# Patient Record
Sex: MALE | Race: ASIAN | Employment: FULL TIME | URBAN - METROPOLITAN AREA
[De-identification: names, ages, dates, MRNs, and addresses within clinical notes are randomized per-mention and may not be internally consistent; named-entity substitution may affect disease eponyms.]

---

## 2017-01-10 ENCOUNTER — OFFICE VISIT (OUTPATIENT)
Dept: FAMILY MEDICINE CLINIC | Age: 37
End: 2017-01-10

## 2017-01-10 VITALS
OXYGEN SATURATION: 99 % | RESPIRATION RATE: 12 BRPM | WEIGHT: 267.6 LBS | HEART RATE: 78 BPM | TEMPERATURE: 97.8 F | SYSTOLIC BLOOD PRESSURE: 126 MMHG | BODY MASS INDEX: 36.24 KG/M2 | DIASTOLIC BLOOD PRESSURE: 89 MMHG | HEIGHT: 72 IN

## 2017-01-10 DIAGNOSIS — M54.50 MIDLINE LOW BACK PAIN WITHOUT SCIATICA, UNSPECIFIED CHRONICITY: ICD-10-CM

## 2017-01-10 DIAGNOSIS — J45.909 BRONCHITIS WITH ASTHMA, SUBACUTE: Primary | ICD-10-CM

## 2017-01-10 DIAGNOSIS — J20.9 BRONCHITIS WITH ASTHMA, SUBACUTE: Primary | ICD-10-CM

## 2017-01-10 DIAGNOSIS — J30.89 PERENNIAL ALLERGIC RHINITIS, UNSPECIFIED ALLERGIC RHINITIS TRIGGER: ICD-10-CM

## 2017-01-10 RX ORDER — CYCLOBENZAPRINE HCL 10 MG
10 TABLET ORAL
Qty: 20 TAB | Refills: 0 | Status: SHIPPED | OUTPATIENT
Start: 2017-01-10 | End: 2020-01-29

## 2017-01-10 RX ORDER — CODEINE PHOSPHATE AND GUAIFENESIN 10; 100 MG/5ML; MG/5ML
5 SOLUTION ORAL
Qty: 120 ML | Refills: 0 | Status: SHIPPED | OUTPATIENT
Start: 2017-01-10 | End: 2017-10-24 | Stop reason: ALTCHOICE

## 2017-01-10 RX ORDER — FLUTICASONE PROPIONATE 50 MCG
2 SPRAY, SUSPENSION (ML) NASAL DAILY
Qty: 1 BOTTLE | Refills: 5 | Status: SHIPPED | OUTPATIENT
Start: 2017-01-10

## 2017-01-10 RX ORDER — FLUTICASONE PROPIONATE 50 MCG
2 SPRAY, SUSPENSION (ML) NASAL DAILY
COMMUNITY
End: 2017-01-10 | Stop reason: SDUPTHER

## 2017-01-10 RX ORDER — ALBUTEROL SULFATE 90 UG/1
1-2 AEROSOL, METERED RESPIRATORY (INHALATION)
Qty: 1 INHALER | Refills: 2 | Status: SHIPPED | OUTPATIENT
Start: 2017-01-10

## 2017-01-10 RX ORDER — METHYLPREDNISOLONE 4 MG/1
TABLET ORAL
Qty: 1 DOSE PACK | Refills: 0 | Status: SHIPPED | OUTPATIENT
Start: 2017-01-10 | End: 2017-08-02 | Stop reason: ALTCHOICE

## 2017-01-10 NOTE — PROGRESS NOTES
HISTORY OF PRESENT ILLNESS  Nato Daniel is a 39 y.o. male. HPI  Upper Respiratory Infection  Patient complains of symptoms of a URI. Symptoms include congestion and cough. Onset of symptoms was 1 month ago, unchanged since that time. He also c/o congestion, coryza, no  fever, non productive cough, post nasal drip and wheezing for the past 1 month . He is drinking plenty of fluids. . Evaluation to date: none. Treatment to date: cough suppressants. Review of Systems   Constitutional: Positive for malaise/fatigue. Negative for chills and fever. HENT: Positive for congestion and sore throat. Negative for ear pain. Respiratory: Positive for cough, sputum production and wheezing. Negative for shortness of breath. Neurological: Negative for headaches. Physical Exam   Constitutional: He is oriented to person, place, and time. He appears well-developed and well-nourished. No distress. HENT:   Right Ear: Tympanic membrane and ear canal normal.   Left Ear: Tympanic membrane and ear canal normal.   Nose: Mucosal edema present. Right sinus exhibits no maxillary sinus tenderness and no frontal sinus tenderness. Left sinus exhibits no maxillary sinus tenderness and no frontal sinus tenderness. Mouth/Throat: Oropharynx is clear and moist.   Cardiovascular: Normal rate, regular rhythm and normal heart sounds. No murmur heard. Pulmonary/Chest: Effort normal. He has no decreased breath sounds. He has wheezes (expiratory) in the left middle field and the left lower field. He has no rhonchi. Lymphadenopathy:     He has no cervical adenopathy. Neurological: He is alert and oriented to person, place, and time. Psychiatric: He has a normal mood and affect. His behavior is normal.   Nursing note and vitals reviewed. ASSESSMENT and PLAN  Mihai was seen today for cough. Diagnoses and all orders for this visit:    Bronchitis with asthma, subacute  Afebrile, will cover with steroid taper and Albuterol. CXR for no improvement of symptoms. -     albuterol (PROVENTIL HFA, VENTOLIN HFA, PROAIR HFA) 90 mcg/actuation inhaler; Take 1-2 Puffs by inhalation every four (4) hours as needed for Wheezing.  -     guaiFENesin-codeine (ROBITUSSIN AC) 100-10 mg/5 mL solution; Take 5 mL by mouth three (3) times daily as needed for Cough. Max Daily Amount: 15 mL. -     methylPREDNISolone (MEDROL DOSEPACK) 4 mg tablet; Take as directed    Midline low back pain without sciatica, unspecified chronicity  -     Refill cyclobenzaprine (FLEXERIL) 10 mg tablet; Take 1 Tab by mouth three (3) times daily as needed for Muscle Spasm(s). Perennial allergic rhinitis, unspecified allergic rhinitis trigger  -     Refill fluticasone (FLONASE) 50 mcg/actuation nasal spray; 2 Sprays by Both Nostrils route daily. I have discussed the diagnosis with the patient and the intended plan as seen in the above orders. The patient has received an after-visit summary and questions were answered concerning future plans. I have discussed medication side effects and warnings with the patient as well. Follow-up Disposition:  Return if symptoms worsen or fail to improve.

## 2017-01-10 NOTE — PROGRESS NOTES
1. Have you been to the ER, urgent care clinic since your last visit? Hospitalized since your last visit? [facility] 12/2016- for strep       2. Have you seen or consulted any other health care providers outside of the [address] [place] [address] since your last visit? Include any pap smears or colon screening.  No      Chief Complaint   Patient presents with    Cough     for the last 3 weeks- producing some yellow mucus occassionally- no fever

## 2017-01-10 NOTE — MR AVS SNAPSHOT
Visit Information Date & Time Provider Department Dept. Phone Encounter #  
 1/10/2017  2:45 PM Ann Fisher, [address] [facility] [phone] [id number] Upcoming Health Maintenance Date Due DTaP/Tdap/Td series (2 - Td) 10/26/2019 Allergies as of 1/10/2017  Review Complete On: 1/10/2017 By: Ann Fisher NP No Known Allergies Current Immunizations  Reviewed on 12/23/2010 Name Date Influenza Vaccine (Quad) PF 10/12/2015 Influenza Vaccine Split 10/26/2009 TDAP Vaccine 10/26/2009 Not reviewed this visit You Were Diagnosed With   
  
 Codes Comments Bronchitis with asthma, subacute    -  Primary ICD-10-CM: J20.9, J45.909 ICD-9-CM: 466.0, 493.90 Midline low back pain without sciatica, unspecified chronicity     ICD-10-CM: M54.5 ICD-9-CM: 724.2 Perennial allergic rhinitis, unspecified allergic rhinitis trigger     ICD-10-CM: J30.89 ICD-9-CM: 477.8 Vitals BP Pulse Temp Resp Height(growth percentile) Weight(growth percentile) 126/89 (BP 1 Location: Right arm, BP Patient Position: Sitting) 78 97.8 °F (36.6 °C) (Oral) 12 5' 11.5\" (1.816 m) 267 lb 9.6 oz (121.4 kg) SpO2 BMI Smoking Status 99% 36.8 kg/m2 Former Smoker Vitals History BMI and BSA Data Body Mass Index Body Surface Area  
 36.8 kg/m 2 2.47 m 2 Preferred Pharmacy Pharmacy Name Phone [id number] [address] [facility] [phone] Your Updated Medication List  
  
   
This list is accurate as of: 1/10/17  3:29 PM.  Always use your most recent med list.  
  
  
  
  
 albuterol 90 mcg/actuation inhaler Commonly known as:  PROVENTIL HFA, VENTOLIN HFA, PROAIR HFA Take 1-2 Puffs by inhalation every four (4) hours as needed for Wheezing. cyclobenzaprine 10 mg tablet Commonly known as:  FLEXERIL  
 Take 1 Tab by mouth three (3) times daily as needed for Muscle Spasm(s). fluticasone 50 mcg/actuation nasal spray Commonly known as:  Naveed Sacks 2 Sprays by Both Nostrils route daily. guaiFENesin-codeine 100-10 mg/5 mL solution Commonly known as:  ROBITUSSIN AC Take 5 mL by mouth three (3) times daily as needed for Cough. Max Daily Amount: 15 mL. methylPREDNISolone 4 mg tablet Commonly known as:  [place] Rocío Take as directed  
  
 multivitamin tablet Commonly known as:  ONE A DAY Take 1 Tab by mouth daily. Prescriptions Printed Refills  
 guaiFENesin-codeine (ROBITUSSIN AC) 100-10 mg/5 mL solution 0 Sig: Take 5 mL by mouth three (3) times daily as needed for Cough. Max Daily Amount: 15 mL. Class: Print Route: Oral  
  
Prescriptions Sent to Pharmacy Refills  
 fluticasone (FLONASE) 50 mcg/actuation nasal spray 5 Si Sprays by Both Nostrils route daily. Class: Normal  
 Pharmacy: [address] [place] [address] Ph #: [phone] Route: Both Nostrils  
 cyclobenzaprine (FLEXERIL) 10 mg tablet 0 Sig: Take 1 Tab by mouth three (3) times daily as needed for Muscle Spasm(s). Class: Normal  
 Pharmacy: [address] [place] [address] Ph #: [phone] Route: Oral  
 albuterol (PROVENTIL HFA, VENTOLIN HFA, PROAIR HFA) 90 mcg/actuation inhaler 2 Sig: Take 1-2 Puffs by inhalation every four (4) hours as needed for Wheezing. Class: Normal  
 Pharmacy: [address] [place] [address] Ph #: [phone] Route: Inhalation  
 methylPREDNISolone (MEDROL DOSEPACK) 4 mg tablet 0 Sig: Take as directed Class: Normal  
 Pharmacy: [address] [place] [address] Ph #: [phone] Patient Instructions Bronchitis: Care Instructions Your Care Instructions Bronchitis is inflammation of the bronchial tubes, which carry air to the lungs. The tubes swell and produce mucus, or phlegm. The mucus and inflamed bronchial tubes make you cough. You may have trouble breathing. Most cases of bronchitis are caused by viruses like those that cause colds. Antibiotics usually do not help and they may be harmful. Bronchitis usually develops rapidly and lasts about 2 to 3 weeks in otherwise healthy people. Follow-up care is a key part of your treatment and safety. Be sure to make and go to all appointments, and call your doctor if you are having problems. It's also a good idea to know your test results and keep a list of the medicines you take. How can you care for yourself at home? · Take all medicines exactly as prescribed. Call your doctor if you think you are having a problem with your medicine. · Get some extra rest. 
· Take an over-the-counter pain medicine, such as acetaminophen (Tylenol), ibuprofen (Advil, Motrin), or naproxen (Aleve) to reduce fever and relieve body aches. Read and follow all instructions on the label. · Do not take two or more pain medicines at the same time unless the doctor told you to. Many pain medicines have acetaminophen, which is Tylenol. Too much acetaminophen (Tylenol) can be harmful. · Take an over-the-counter cough medicine that contains dextromethorphan to help quiet a dry, hacking cough so that you can sleep. Avoid cough medicines that have more than one active ingredient. Read and follow all instructions on the label. · Breathe moist air from a humidifier, hot shower, or sink filled with hot water. The heat and moisture will thin mucus so you can cough it out. · Do not smoke. Smoking can make bronchitis worse. If you need help quitting, talk to your doctor about stop-smoking programs and medicines. These can increase your chances of quitting for good. When should you call for help? Call 911 anytime you think you may need emergency care. For example, call if: 
· You have severe trouble breathing. Call your doctor now or seek immediate medical care if: 
· You have new or worse trouble breathing. · You cough up dark brown or bloody mucus (sputum). · You have a new or higher fever. · You have a new rash. Watch closely for changes in your health, and be sure to contact your doctor if: 
· You cough more deeply or more often, especially if you notice more mucus or a change in the color of your mucus. · You are not getting better as expected. Where can you learn more? Go to http://behzad-rodo.info/. Enter H333 in the search box to learn more about \"Bronchitis: Care Instructions. \" Current as of: May 23, 2016 Content Version: 11.1 © 7439-5759 Ubertesters. Care instructions adapted under license by Ajubeo (which disclaims liability or warranty for this information). If you have questions about a medical condition or this instruction, always ask your healthcare professional. [address] any warranty or liability for your use of this information. Introducing [facility] & HEALTH SERVICES! Dear Kelsey Santiago: 
Thank you for requesting a [employer] account. Our records indicate that you already have an active [employer] account. You can access your account anytime at [email or web address] Did you know that you can access your hospital and ER discharge instructions at any time in [employer]? You can also review all of your test results from your hospital stay or ER visit. Additional Information If you have questions, please visit the Frequently Asked Questions section of the [employer] website at [email or web address] Remember, [employer] is NOT to be used for urgent needs. For medical emergencies, dial 911. Now available from your iPhone and Android! Please provide this summary of care documentation to your next provider. Your primary care clinician is listed as [address] [place] [address], [facility]/s . If you have any questions after today's visit, please call [phone].

## 2017-01-10 NOTE — PATIENT INSTRUCTIONS
Bronchitis: Care Instructions  Your Care Instructions    Bronchitis is inflammation of the bronchial tubes, which carry air to the lungs. The tubes swell and produce mucus, or phlegm. The mucus and inflamed bronchial tubes make you cough. You may have trouble breathing. Most cases of bronchitis are caused by viruses like those that cause colds. Antibiotics usually do not help and they may be harmful. Bronchitis usually develops rapidly and lasts about 2 to 3 weeks in otherwise healthy people. Follow-up care is a key part of your treatment and safety. Be sure to make and go to all appointments, and call your doctor if you are having problems. It's also a good idea to know your test results and keep a list of the medicines you take. How can you care for yourself at home? · Take all medicines exactly as prescribed. Call your doctor if you think you are having a problem with your medicine. · Get some extra rest.  · Take an over-the-counter pain medicine, such as acetaminophen (Tylenol), ibuprofen (Advil, Motrin), or naproxen (Aleve) to reduce fever and relieve body aches. Read and follow all instructions on the label. · Do not take two or more pain medicines at the same time unless the doctor told you to. Many pain medicines have acetaminophen, which is Tylenol. Too much acetaminophen (Tylenol) can be harmful. · Take an over-the-counter cough medicine that contains dextromethorphan to help quiet a dry, hacking cough so that you can sleep. Avoid cough medicines that have more than one active ingredient. Read and follow all instructions on the label. · Breathe moist air from a humidifier, hot shower, or sink filled with hot water. The heat and moisture will thin mucus so you can cough it out. · Do not smoke. Smoking can make bronchitis worse. If you need help quitting, talk to your doctor about stop-smoking programs and medicines. These can increase your chances of quitting for good.   When should you call for help? Call 911 anytime you think you may need emergency care. For example, call if:  · You have severe trouble breathing. Call your doctor now or seek immediate medical care if:  · You have new or worse trouble breathing. · You cough up dark brown or bloody mucus (sputum). · You have a new or higher fever. · You have a new rash. Watch closely for changes in your health, and be sure to contact your doctor if:  · You cough more deeply or more often, especially if you notice more mucus or a change in the color of your mucus. · You are not getting better as expected. Where can you learn more? Go to http://behzad-rodo.info/. Enter H333 in the search box to learn more about \"Bronchitis: Care Instructions. \"  Current as of: May 23, 2016  Content Version: 11.1  © 0898-2949 Teamisto, Incorporated. Care instructions adapted under license by Aerospike (which disclaims liability or warranty for this information). If you have questions about a medical condition or this instruction, always ask your healthcare professional. [address] any warranty or liability for your use of this information.

## 2017-08-02 ENCOUNTER — OFFICE VISIT (OUTPATIENT)
Dept: FAMILY MEDICINE CLINIC | Age: 37
End: 2017-08-02

## 2017-08-02 VITALS
BODY MASS INDEX: 36.44 KG/M2 | HEART RATE: 84 BPM | WEIGHT: 269 LBS | TEMPERATURE: 98.1 F | OXYGEN SATURATION: 96 % | DIASTOLIC BLOOD PRESSURE: 88 MMHG | HEIGHT: 72 IN | RESPIRATION RATE: 18 BRPM | SYSTOLIC BLOOD PRESSURE: 125 MMHG

## 2017-08-02 DIAGNOSIS — Z13.0 SCREENING FOR ENDOCRINE, METABOLIC AND IMMUNITY DISORDER: ICD-10-CM

## 2017-08-02 DIAGNOSIS — Z13.29 SCREENING FOR ENDOCRINE, METABOLIC AND IMMUNITY DISORDER: ICD-10-CM

## 2017-08-02 DIAGNOSIS — Z13.228 SCREENING FOR ENDOCRINE, METABOLIC AND IMMUNITY DISORDER: ICD-10-CM

## 2017-08-02 DIAGNOSIS — R53.82 CHRONIC FATIGUE: Primary | ICD-10-CM

## 2017-08-02 DIAGNOSIS — N52.9 ERECTILE DYSFUNCTION, UNSPECIFIED ERECTILE DYSFUNCTION TYPE: ICD-10-CM

## 2017-08-02 RX ORDER — SILDENAFIL 100 MG/1
100 TABLET, FILM COATED ORAL AS NEEDED
Qty: 9 TAB | Refills: 5 | Status: SHIPPED | OUTPATIENT
Start: 2017-08-02 | End: 2017-10-24

## 2017-08-02 NOTE — PATIENT INSTRUCTIONS
Fatigue: Care Instructions  Your Care Instructions  Fatigue is a feeling of tiredness, exhaustion, or lack of energy. You may feel fatigue because of too much or not enough activity. It can also come from stress, lack of sleep, boredom, and poor diet. Many medical problems, such as viral infections, can cause fatigue. Emotional problems, especially depression, are often the cause of fatigue. Fatigue is most often a symptom of another problem. Treatment for fatigue depends on the cause. For example, if you have fatigue because you have a certain health problem, treating this problem also treats your fatigue. If depression or anxiety is the cause, treatment may help. Follow-up care is a key part of your treatment and safety. Be sure to make and go to all appointments, and call your doctor if you are having problems. It's also a good idea to know your test results and keep a list of the medicines you take. How can you care for yourself at home? · Get regular exercise. But don't overdo it. Go back and forth between rest and exercise. · Get plenty of rest.  · Eat a healthy diet. Do not skip meals, especially breakfast.  · Reduce your use of caffeine, tobacco, and alcohol. Caffeine is most often found in coffee, tea, cola drinks, and chocolate. · Limit medicines that can cause fatigue. This includes tranquilizers and cold and allergy medicines. When should you call for help? Watch closely for changes in your health, and be sure to contact your doctor if:  · You have new symptoms such as fever or a rash. · Your fatigue gets worse. · You have been feeling down, depressed, or hopeless. Or you may have lost interest in things that you usually enjoy. · You are not getting better as expected. Where can you learn more? Go to http://behzad-rodo.info/. Enter J406 in the search box to learn more about \"Fatigue: Care Instructions. \"  Current as of: March 20, 2017  Content Version: 11.3  © 9705-2606 Healthwise, Incorporated. Care instructions adapted under license by Cozy Queen (which disclaims liability or warranty for this information). If you have questions about a medical condition or this instruction, always ask your healthcare professional. [place] [address] any warranty or liability for your use of this information.

## 2017-08-02 NOTE — MR AVS SNAPSHOT
Visit Information Date & Time Provider Department Dept. Phone Encounter #  
 8/2/2017  1:45 PM Governor Brian  [facility] [address] [phone] [id number] Follow-up Instructions Return if symptoms worsen or fail to improve. Your Appointments 10/24/2017  2:00 PM  
COMPLETE PHYSICAL with Governor Brian  [facility] [address] ([address]) Appt Note: CPE  
 [address] [address]  
[phone]  
  
   
 [address] [address] Upcoming Health Maintenance Date Due Pneumococcal 19-64 Medium Risk (1 of 1 - PPSV23) 9/21/1999 INFLUENZA AGE 9 TO ADULT 8/1/2017 DTaP/Tdap/Td series (2 - Td) 10/26/2019 Allergies as of 8/2/2017  Review Complete On: 8/2/2017 By: Governor Brian NP No Known Allergies Current Immunizations  Reviewed on 12/23/2010 Name Date Influenza Vaccine (Quad) PF 10/12/2015 Influenza Vaccine Split 10/26/2009 TDAP Vaccine 10/26/2009 Not reviewed this visit You Were Diagnosed With   
  
 Codes Comments Chronic fatigue    -  Primary ICD-10-CM: R53.82 
ICD-9-CM: 780.79 Erectile dysfunction, unspecified erectile dysfunction type     ICD-10-CM: N52.9 ICD-9-CM: 607.84 Routine general medical examination at a health care facility     ICD-10-CM: Z00.00 ICD-9-CM: V70.0 Vitals BP Pulse Temp Resp Height(growth percentile) Weight(growth percentile) 125/88 (BP 1 Location: Left arm, BP Patient Position: Sitting) 84 98.1 °F (36.7 °C) (Oral) 18 5' 11.5\" (1.816 m) 269 lb (122 kg) SpO2 BMI Smoking Status 96% 36.99 kg/m2 Former Smoker BMI and BSA Data Body Mass Index Body Surface Area  
 36.99 kg/m 2 2.48 m 2 Preferred Pharmacy Pharmacy Name Phone [id number] [address] [facility] [phone] Your Updated Medication List  
  
   
 This list is accurate as of: 8/2/17  2:17 PM.  Always use your most recent med list.  
  
  
  
  
 albuterol 90 mcg/actuation inhaler Commonly known as:  PROVENTIL HFA, VENTOLIN HFA, PROAIR HFA Take 1-2 Puffs by inhalation every four (4) hours as needed for Wheezing. cyclobenzaprine 10 mg tablet Commonly known as:  FLEXERIL Take 1 Tab by mouth three (3) times daily as needed for Muscle Spasm(s). fluticasone 50 mcg/actuation nasal spray Commonly known as:  Arlys Pock 2 Sprays by Both Nostrils route daily. guaiFENesin-codeine 100-10 mg/5 mL solution Commonly known as:  ROBITUSSIN AC Take 5 mL by mouth three (3) times daily as needed for Cough. Max Daily Amount: 15 mL.  
  
 multivitamin tablet Commonly known as:  ONE A DAY Take 1 Tab by mouth daily. sildenafil citrate 100 mg tablet Commonly known as:  VIAGRA Take 1 Tab by mouth as needed. Prescriptions Printed Refills  
 sildenafil citrate (VIAGRA) 100 mg tablet 5 Sig: Take 1 Tab by mouth as needed. Class: Print Route: Oral  
  
We Performed the Following CBC W/O DIFF [84806 CPT(R)] LIPID PANEL [10707 CPT(R)] METABOLIC PANEL, COMPREHENSIVE [36540 CPT(R)] TESTOSTERONE, FREE+TOTAL [[id number] Custom] TSH AND FREE T4 [28859 CPT(R)] Follow-up Instructions Return if symptoms worsen or fail to improve. Patient Instructions Fatigue: Care Instructions Your Care Instructions Fatigue is a feeling of tiredness, exhaustion, or lack of energy. You may feel fatigue because of too much or not enough activity. It can also come from stress, lack of sleep, boredom, and poor diet. Many medical problems, such as viral infections, can cause fatigue. Emotional problems, especially depression, are often the cause of fatigue. Fatigue is most often a symptom of another problem. Treatment for fatigue depends on the cause.  For example, if you have fatigue because you have a certain health problem, treating this problem also treats your fatigue. If depression or anxiety is the cause, treatment may help. Follow-up care is a key part of your treatment and safety. Be sure to make and go to all appointments, and call your doctor if you are having problems. It's also a good idea to know your test results and keep a list of the medicines you take. How can you care for yourself at home? · Get regular exercise. But don't overdo it. Go back and forth between rest and exercise. · Get plenty of rest. 
· Eat a healthy diet. Do not skip meals, especially breakfast. 
· Reduce your use of caffeine, tobacco, and alcohol. Caffeine is most often found in coffee, tea, cola drinks, and chocolate. · Limit medicines that can cause fatigue. This includes tranquilizers and cold and allergy medicines. When should you call for help? Watch closely for changes in your health, and be sure to contact your doctor if: 
· You have new symptoms such as fever or a rash. · Your fatigue gets worse. · You have been feeling down, depressed, or hopeless. Or you may have lost interest in things that you usually enjoy. · You are not getting better as expected. Where can you learn more? Go to http://behzad-rodo.info/. Enter B947 in the search box to learn more about \"Fatigue: Care Instructions. \" Current as of: March 20, 2017 Content Version: 11.3 © 3817-4910 Funplus. Care instructions adapted under license by Kingsoft Cloud (which disclaims liability or warranty for this information). If you have questions about a medical condition or this instruction, always ask your healthcare professional. [address] any warranty or liability for your use of this information. Introducing [facility] & HEALTH SERVICES! Dear Issac Kc: 
Thank you for requesting a [employer] account.   Our records indicate that you already have an active [employer] account. You can access your account anytime at [email or web address] Did you know that you can access your hospital and ER discharge instructions at any time in [employer]? You can also review all of your test results from your hospital stay or ER visit. Additional Information If you have questions, please visit the Frequently Asked Questions section of the [employer] website at [email or web address] Remember, [employer] T[employer] is NOT to be used for urgent needs. For medical emergencies, dial 911. Now available from your iPhone and Android! Please provide this summary of care documentation to your next provider. Your primary care clinician is listed as [address] [address], [facility]/s . If you have any questions after today's visit, please call [phone].

## 2017-08-02 NOTE — PROGRESS NOTES
Chief Complaint   Patient presents with    Lethargy     low testtosterone levels--drawn ~2months ago       1. Have you been to the ER, urgent care clinic since your last visit? Hospitalized since your last visit? No    2. Have you seen or consulted any other health care providers outside of the [address] [place] [address] since your last visit? Include any pap smears or colon screening.  Yes denstist for regular cleaning

## 2017-08-02 NOTE — PROGRESS NOTES
Janett Madison is a 39 y.o. male who was seen in clinic today (8/2/2017). Subjective:  Fatigue  Patient complains of fatigue. Symptoms began several years ago. Sentinal symptom the patient feels fatigue began with: fatigue, low libido. Symptoms of his fatigue have been general malaise, feelings of depression, decreased libido and ED. Patient describes the following psychologic symptoms: stress at work: mild. Patient denies fever, significant change in weight. The course has been symptoms have progressed to a point and plateaued. . Severity has been symptoms bothersome, but easily able to carry out all usual work/school/family. Had testosterone levels checked at a men's health clinic which indicated low testosterone (<150) per patient. Prior to Admission medications    Medication Sig Start Date End Date Taking? Authorizing Provider   sildenafil citrate (VIAGRA) 100 mg tablet Take 1 Tab by mouth as needed. 8/2/17  Yes Kurtis Bumps, NP   fluticasone (FLONASE) 50 mcg/actuation nasal spray 2 Sprays by Both Nostrils route daily. 1/10/17  Yes Kurtis Bumps, NP   cyclobenzaprine (FLEXERIL) 10 mg tablet Take 1 Tab by mouth three (3) times daily as needed for Muscle Spasm(s). 1/10/17  Yes Kurtis Bumps, NP   albuterol (PROVENTIL HFA, VENTOLIN HFA, PROAIR HFA) 90 mcg/actuation inhaler Take 1-2 Puffs by inhalation every four (4) hours as needed for Wheezing. 1/10/17  Yes Kurtis Bumps, NP   guaiFENesin-codeine (ROBITUSSIN AC) 100-10 mg/5 mL solution Take 5 mL by mouth three (3) times daily as needed for Cough. Max Daily Amount: 15 mL. 1/10/17  Yes Kurtis Bumps, NP   multivitamin (ONE A DAY) tablet Take 1 Tab by mouth daily. Yes Historical Provider          No Known Allergies        ROS  See HPI    Objective:   Physical Exam   Constitutional: He is oriented to person, place, and time. He appears well-developed and well-nourished.    Cardiovascular: Normal rate, regular rhythm and intact distal pulses. Exam reveals no gallop and no friction rub. No murmur heard. Pulmonary/Chest: Effort normal and breath sounds normal. No respiratory distress. Neurological: He is alert and oriented to person, place, and time. Psychiatric: He has a normal mood and affect. His speech is normal and behavior is normal. Thought content normal.   Nursing note and vitals reviewed. Visit Vitals    /88 (BP 1 Location: Left arm, BP Patient Position: Sitting)    Pulse 84    Temp 98.1 °F (36.7 °C) (Oral)    Resp 18    Ht 5' 11.5\" (1.816 m)    Wt 269 lb (122 kg)    SpO2 96%    BMI 36.99 kg/m2       Assessment & Plan:  Diagnoses and all orders for this visit:    1. Chronic fatigue  Rule out endocrine, hematologic or metabolic etiology. -     CBC W/O DIFF  -     TESTOSTERONE, FREE+TOTAL  -     TSH AND FREE T4    2. Erectile dysfunction, unspecified erectile dysfunction type  -     Check TESTOSTERONE, FREE+TOTAL  -     Begin sildenafil citrate (VIAGRA) 100 mg tablet; Take 1 Tab by mouth as needed. 3. Screening for endocrine, metabolic and immunity disorder  -     METABOLIC PANEL, COMPREHENSIVE  -     LIPID PANEL      I have discussed the diagnosis with the patient and the intended plan as seen in the above orders. The patient has received an after-visit summary along with patient information handout. I have discussed medication side effects and warnings with the patient as well. Follow-up Disposition:  Return if symptoms worsen or fail to improve.         Janene Dailey NP

## 2017-09-02 LAB
ALBUMIN SERPL-MCNC: 4.5 G/DL (ref 3.5–5.5)
ALBUMIN/GLOB SERPL: 1.6 {RATIO} (ref 1.2–2.2)
ALP SERPL-CCNC: 65 IU/L (ref 39–117)
ALT SERPL-CCNC: 69 IU/L (ref 0–44)
AST SERPL-CCNC: 37 IU/L (ref 0–40)
BILIRUB SERPL-MCNC: 0.5 MG/DL (ref 0–1.2)
BUN SERPL-MCNC: 13 MG/DL (ref 6–20)
BUN/CREAT SERPL: 14 (ref 9–20)
CALCIUM SERPL-MCNC: 9.5 MG/DL (ref 8.7–10.2)
CHLORIDE SERPL-SCNC: 101 MMOL/L (ref 96–106)
CHOLEST SERPL-MCNC: 202 MG/DL (ref 100–199)
CO2 SERPL-SCNC: 23 MMOL/L (ref 18–29)
CREAT SERPL-MCNC: 0.91 MG/DL (ref 0.76–1.27)
ERYTHROCYTE [DISTWIDTH] IN BLOOD BY AUTOMATED COUNT: 13.6 % (ref 12.3–15.4)
GLOBULIN SER CALC-MCNC: 2.9 G/DL (ref 1.5–4.5)
GLUCOSE SERPL-MCNC: 100 MG/DL (ref 65–99)
HCT VFR BLD AUTO: 45.3 % (ref 37.5–51)
HDLC SERPL-MCNC: 45 MG/DL
HGB BLD-MCNC: 14.9 G/DL (ref 12.6–17.7)
INTERPRETATION, 910389: NORMAL
LDLC SERPL CALC-MCNC: 121 MG/DL (ref 0–99)
MCH RBC QN AUTO: 29.7 PG (ref 26.6–33)
MCHC RBC AUTO-ENTMCNC: 32.9 G/DL (ref 31.5–35.7)
MCV RBC AUTO: 90 FL (ref 79–97)
PLATELET # BLD AUTO: 381 X10E3/UL (ref 150–379)
POTASSIUM SERPL-SCNC: 5.3 MMOL/L (ref 3.5–5.2)
PROT SERPL-MCNC: 7.4 G/DL (ref 6–8.5)
RBC # BLD AUTO: 5.02 X10E6/UL (ref 4.14–5.8)
SODIUM SERPL-SCNC: 141 MMOL/L (ref 134–144)
T4 FREE SERPL-MCNC: 1.36 NG/DL (ref 0.82–1.77)
TESTOST FREE SERPL-MCNC: 9.5 PG/ML (ref 8.7–25.1)
TESTOST SERPL-MCNC: 288.2 NG/DL (ref 264–916)
TRIGL SERPL-MCNC: 178 MG/DL (ref 0–149)
TSH SERPL DL<=0.005 MIU/L-ACNC: 2.31 UIU/ML (ref 0.45–4.5)
VLDLC SERPL CALC-MCNC: 36 MG/DL (ref 5–40)
WBC # BLD AUTO: 8.3 X10E3/UL (ref 3.4–10.8)

## 2017-10-24 ENCOUNTER — OFFICE VISIT (OUTPATIENT)
Dept: FAMILY MEDICINE CLINIC | Age: 37
End: 2017-10-24

## 2017-10-24 VITALS
SYSTOLIC BLOOD PRESSURE: 120 MMHG | TEMPERATURE: 98.5 F | HEIGHT: 72 IN | HEART RATE: 85 BPM | BODY MASS INDEX: 36.98 KG/M2 | DIASTOLIC BLOOD PRESSURE: 78 MMHG | RESPIRATION RATE: 14 BRPM | WEIGHT: 273 LBS | OXYGEN SATURATION: 98 %

## 2017-10-24 DIAGNOSIS — N52.9 ERECTILE DYSFUNCTION, UNSPECIFIED ERECTILE DYSFUNCTION TYPE: ICD-10-CM

## 2017-10-24 DIAGNOSIS — Z23 ENCOUNTER FOR IMMUNIZATION: ICD-10-CM

## 2017-10-24 DIAGNOSIS — E66.9 OBESITY (BMI 30-39.9): ICD-10-CM

## 2017-10-24 DIAGNOSIS — Z00.00 ROUTINE GENERAL MEDICAL EXAMINATION AT A HEALTH CARE FACILITY: Primary | ICD-10-CM

## 2017-10-24 RX ORDER — SILDENAFIL CITRATE 20 MG/1
40-60 TABLET ORAL
Qty: 30 TAB | Refills: 4 | Status: SHIPPED | OUTPATIENT
Start: 2017-10-24 | End: 2018-05-29 | Stop reason: SDUPTHER

## 2017-10-24 NOTE — PROGRESS NOTES
Jj Briggs is a 40 y.o. male who was seen in clinic today (10/24/2017). Subjective:  Cardiovascular Review:  The patient has hyperlipidemia and obesity. Diet and Lifestyle: generally follows a low fat low cholesterol diet, generally follows a low sodium diet, exercises regularly, nonsmoker. Patient recently hired a  and is going to the gym routinely. Home BP Monitoring: is not measured at home. Pertinent ROS: no TIA's, no chest pain on exertion, no dyspnea on exertion, no swelling of ankles. Reports effectiveness with Viagra but medication cost $325. Prior to Admission medications    Medication Sig Start Date End Date Taking? Authorizing Provider   sildenafil (REVATIO) 20 mg tablet Take 2-3 Tabs by mouth daily as needed. 10/24/17  Yes Bradley Zuluaga NP   fluticasone (FLONASE) 50 mcg/actuation nasal spray 2 Sprays by Both Nostrils route daily. 1/10/17  Yes Bradley Zuluaga NP   cyclobenzaprine (FLEXERIL) 10 mg tablet Take 1 Tab by mouth three (3) times daily as needed for Muscle Spasm(s). 1/10/17  Yes Bradley Zuluaga NP   albuterol (PROVENTIL HFA, VENTOLIN HFA, PROAIR HFA) 90 mcg/actuation inhaler Take 1-2 Puffs by inhalation every four (4) hours as needed for Wheezing. 1/10/17  Yes Bradley Zuluaga NP   multivitamin (ONE A DAY) tablet Take 1 Tab by mouth daily. Yes Historical Provider          No Known Allergies        Review of Systems   Constitutional: Negative for malaise/fatigue and weight loss. HENT: Negative for hearing loss. Eyes: Negative for blurred vision. Respiratory: Negative for shortness of breath. Cardiovascular: Negative for chest pain, palpitations and leg swelling. Gastrointestinal: Negative for abdominal pain, constipation, diarrhea and heartburn. Genitourinary: Negative for frequency and urgency. Musculoskeletal: Negative for back pain, joint pain and myalgias. Neurological: Negative for dizziness, weakness and headaches. Endo/Heme/Allergies: Does not bruise/bleed easily. Psychiatric/Behavioral: Negative for depression. Objective:   Physical Exam   Constitutional: He is oriented to person, place, and time. He appears well-developed and well-nourished. No distress. HENT:   Right Ear: Tympanic membrane and ear canal normal.   Left Ear: Tympanic membrane and ear canal normal.   Nose: No mucosal edema. Right sinus exhibits no maxillary sinus tenderness and no frontal sinus tenderness. Left sinus exhibits no maxillary sinus tenderness and no frontal sinus tenderness. Mouth/Throat: Oropharynx is clear and moist.   Eyes: EOM are normal. Pupils are equal, round, and reactive to light. Neck: Normal range of motion. Neck supple. No JVD present. Carotid bruit is not present. No thyromegaly present. Cardiovascular: Normal rate, regular rhythm, normal heart sounds and intact distal pulses. Exam reveals no gallop and no friction rub. No murmur heard. Pulmonary/Chest: Effort normal and breath sounds normal. No respiratory distress. He has no decreased breath sounds. He has no wheezes. He has no rhonchi. Abdominal: Soft. Bowel sounds are normal. He exhibits no distension. There is no tenderness. Musculoskeletal: He exhibits no edema. Lymphadenopathy:     He has no cervical adenopathy. Neurological: He is alert and oriented to person, place, and time. Psychiatric: He has a normal mood and affect. His behavior is normal.   Nursing note and vitals reviewed. Visit Vitals    /78 (BP 1 Location: Left arm, BP Patient Position: Sitting)    Pulse 85    Temp 98.5 °F (36.9 °C) (Oral)    Resp 14    Ht 5' 11.5\" (1.816 m)    Wt 273 lb (123.8 kg)    SpO2 98%    BMI 37.55 kg/m2       Assessment & Plan:  Diagnoses and all orders for this visit:    1. Routine general medical examination at a health care facility    2. Obesity (BMI 30-39. 9)  Discussed need for weight loss through diet and exercise.  Reviewed decreased caloric intake and increased activity. 3. Encounter for immunization  -     Influenza virus vaccine (QUADRIVALENT PRES FREE SYRINGE) IM ([id number])  -     OR IMMUNIZ ADMIN,1 SINGLE/COMB VAC/TOXOID    4. Erectile dysfunction, unspecified erectile dysfunction type  -     Begin sildenafil (REVATIO) 20 mg tablet; Take 2-3 Tabs by mouth daily as needed. I have discussed the diagnosis with the patient and the intended plan as seen in the above orders. The patient has received an after-visit summary along with patient information handout. I have discussed medication side effects and warnings with the patient as well. Follow-up Disposition:  Return in about 1 year (around 10/24/2018) for Annual Exam - 30 minutes.         Bradley Zuluaga NP

## 2017-10-24 NOTE — MR AVS SNAPSHOT
Visit Information Date & Time Provider Department Dept. Phone Encounter #  
 10/24/2017  2:00 PM Lincoln Arreola  [address] [phone] [id number] Follow-up Instructions Return in about 1 year (around 10/24/2018) for Annual Exam - 30 minutes. Upcoming Health Maintenance Date Due Pneumococcal 19-64 Medium Risk (1 of 1 - PPSV23) 9/21/1999 DTaP/Tdap/Td series (2 - Td) 10/26/2019 Allergies as of 10/24/2017  Review Complete On: 10/24/2017 By: Lincoln Arreola NP No Known Allergies Current Immunizations  Reviewed on 12/23/2010 Name Date Influenza Vaccine (Quad) PF 10/24/2017, 10/12/2015 Influenza Vaccine Split 10/26/2009 TDAP Vaccine 10/26/2009 Not reviewed this visit You Were Diagnosed With   
  
 Codes Comments Routine general medical examination at a health care facility    -  Primary ICD-10-CM: Z00.00 ICD-9-CM: V70.0 Obesity (BMI 30-39. 9)     ICD-10-CM: E66.9 ICD-9-CM: 278.00 Encounter for immunization     ICD-10-CM: N41 ICD-9-CM: V03.89 Erectile dysfunction, unspecified erectile dysfunction type     ICD-10-CM: N52.9 ICD-9-CM: 607.84 Vitals BP Pulse Temp Resp Height(growth percentile) Weight(growth percentile) 120/78 (BP 1 Location: Left arm, BP Patient Position: Sitting) 85 98.5 °F (36.9 °C) (Oral) 14 5' 11.5\" (1.816 m) 273 lb (123.8 kg) SpO2 BMI Smoking Status 98% 37.55 kg/m2 Former Smoker Vitals History BMI and BSA Data Body Mass Index Body Surface Area  
 37.55 kg/m 2 2.5 m 2 Preferred Pharmacy Pharmacy Name Phone [id number] [place], [address] AT [facility] [phone] Your Updated Medication List  
  
   
This list is accurate as of: 10/24/17  2:04 PM.  Always use your most recent med list.  
  
  
  
  
 albuterol 90 mcg/actuation inhaler Commonly known as:  PROVENTIL HFA, VENTOLIN HFA, PROAIR HFA Take 1-2 Puffs by inhalation every four (4) hours as needed for Wheezing. cyclobenzaprine 10 mg tablet Commonly known as:  FLEXERIL Take 1 Tab by mouth three (3) times daily as needed for Muscle Spasm(s). fluticasone 50 mcg/actuation nasal spray Commonly known as:  [place] 2 Sprays by Both Nostrils route daily. multivitamin tablet Commonly known as:  ONE A DAY Take 1 Tab by mouth daily. sildenafil 20 mg tablet Commonly known as:  REVATIO Take 2-3 Tabs by mouth daily as needed. Prescriptions Printed Refills  
 sildenafil (REVATIO) 20 mg tablet 4 Sig: Take 2-3 Tabs by mouth daily as needed. Class: Print Route: Oral  
  
We Performed the Following INFLUENZA VIRUS VAC QUAD,SPLIT,PRESV FREE SYRINGE IM [id number] CPT(R)] WA IMMUNIZ ADMIN,1 SINGLE/COMB VAC/TOXOID [id number] CPT(R)] Follow-up Instructions Return in about 1 year (around 10/24/2018) for Annual Exam - 30 minutes. Patient Instructions Vaccine Information Statement Influenza (Flu) Vaccine (Inactivated or Recombinant): What you need to know Many Vaccine Information Statements are available in [place] and other languages. See www.immunize.org/vis Hojas de Información Sobre Vacunas están disponibles en Español y en muchos otros idiomas. Visite www.immunize.org/vis 1. Why get vaccinated? Influenza (flu) is a contagious disease that spreads around the [place] every year, usually between October and May. Flu is caused by influenza viruses, and is spread mainly by coughing, sneezing, and close contact. Anyone can get flu. Flu strikes suddenly and can last several days. Symptoms vary by age, but can include: 
 fever/chills  sore throat  muscle aches  fatigue  cough  headache  runny or stuffy nose Flu can also lead to pneumonia and blood infections, and cause diarrhea and seizures in children. If you have a medical condition, such as heart or lung disease, flu can make it worse. Flu is more dangerous for some people. Infants and young children, people 72years of age and older, pregnant women, and people with certain health conditions or a weakened immune system are at greatest risk. Each year thousands of people in the [facility] die from flu, and many more are hospitalized. Flu vaccine can: 
 keep you from getting flu, 
 make flu less severe if you do get it, and 
 keep you from spreading flu to your family and other people. 2. Inactivated and recombinant flu vaccines A dose of flu vaccine is recommended every flu season. Children 6 months through 6years of age may need two doses during the same flu season. Everyone else needs only one dose each flu season. Some inactivated flu vaccines contain a very small amount of a mercury-based preservative called thimerosal. Studies have not shown thimerosal in vaccines to be harmful, but flu vaccines that do not contain thimerosal are available. There is no live flu virus in flu shots. They cannot cause the flu. There are many flu viruses, and they are always changing. Each year a new flu vaccine is made to protect against three or four viruses that are likely to cause disease in the upcoming flu season. But even when the vaccine doesnt exactly match these viruses, it may still provide some protection Flu vaccine cannot prevent: 
 flu that is caused by a virus not covered by the vaccine, or 
 illnesses that look like flu but are not. It takes about 2 weeks for protection to develop after vaccination, and protection lasts through the flu season. 3. Some people should not get this vaccine Tell the person who is giving you the vaccine:  If you have any severe, life-threatening allergies.    
If you ever had a life-threatening allergic reaction after a dose of flu vaccine, or have a severe allergy to any part of this vaccine, you may be advised not to get vaccinated. Most, but not all, types of flu vaccine contain a small amount of egg protein.  If you ever had Guillain-Barré Syndrome (also called GBS). Some people with a history of GBS should not get this vaccine. This should be discussed with your doctor.  If you are not feeling well. It is usually okay to get flu vaccine when you have a mild illness, but you might be asked to come back when you feel better. 4. Risks of a vaccine reaction With any medicine, including vaccines, there is a chance of reactions. These are usually mild and go away on their own, but serious reactions are also possible. Most people who get a flu shot do not have any problems with it. Minor problems following a flu shot include:  
 soreness, redness, or swelling where the shot was given  hoarseness  sore, red or itchy eyes  cough  fever  aches  headache  itching  fatigue If these problems occur, they usually begin soon after the shot and last 1 or 2 days. More serious problems following a flu shot can include the following:  There may be a small increased risk of Guillain-Barré Syndrome (GBS) after inactivated flu vaccine. This risk has been estimated at 1 or 2 additional cases per million people vaccinated. This is much lower than the risk of severe complications from flu, which can be prevented by flu vaccine.  Young children who get the flu shot along with pneumococcal vaccine (PCV13) and/or DTaP vaccine at the same time might be slightly more likely to have a seizure caused by fever. Ask your doctor for more information. Tell your doctor if a child who is getting flu vaccine has ever had a seizure. Problems that could happen after any injected vaccine:  People sometimes faint after a medical procedure, including vaccination. Sitting or lying down for about 15 minutes can help prevent fainting, and injuries caused by a fall. Tell your doctor if you feel dizzy, or have vision changes or ringing in the ears.  Some people get severe pain in the shoulder and have difficulty moving the arm where a shot was given. This happens very rarely.  Any medication can cause a severe allergic reaction. Such reactions from a vaccine are very rare, estimated at about 1 in a million doses, and would happen within a few minutes to a few hours after the vaccination. As with any medicine, there is a very remote chance of a vaccine causing a serious injury or death. The safety of vaccines is always being monitored. For more information, visit: www.cdc.gov/vaccinesafety/ 
 
 
The [facility] Vaccine Injury Compensation Program (VICP) is a federal program that was created to compensate people who may have been injured by certain vaccines.  
 
Persons who believe they may have been injured by a vaccine can learn about the program and about filing a claim by calling 3-877.584.9083 or visiting the [employer] website at www.[facility].gov/vaccinecompensation. There is a time limit to file a claim for compensation. 7. How can I learn more?  Ask your healthcare provider. He or she can give you the vaccine package insert or suggest other sources of information.  Call your local or state health department.  Contact the Centers for Disease Control and Prevention (CDC): 
- Call 4-636.286.9999 (1-800-CDC-INFO) or 
- Visit CDCs website at www.cdc.gov/flu Vaccine Information Statement Inactivated Influenza Vaccine 8/7/2015 
[address] 112MU-95 Department of [facility] and [employer] Centers for Disease Control and Prevention Office Use Only Well Visit, Ages 25 to 48: Care Instructions Your Care Instructions Physical exams can help you stay healthy. Your doctor has checked your overall health and may have suggested ways to take good care of yourself. He or she also may have recommended tests. At home, you can help prevent illness with healthy eating, regular exercise, and other steps. Follow-up care is a key part of your treatment and safety. Be sure to make and go to all appointments, and call your doctor if you are having problems. It's also a good idea to know your test results and keep a list of the medicines you take. How can you care for yourself at home? · Reach and stay at a healthy weight. This will lower your risk for many problems, such as obesity, diabetes, heart disease, and high blood pressure. · Get at least 30 minutes of physical activity on most days of the week. Walking is a good choice. You also may want to do other activities, such as running, swimming, cycling, or playing tennis or team sports. Discuss any changes in your exercise program with your doctor. · Do not smoke or allow others to smoke around you.  If you need help quitting, talk to your doctor about stop-smoking programs and medicines. These can increase your chances of quitting for good. · Talk to your doctor about whether you have any risk factors for sexually transmitted infections (STIs). Having one sex partner (who does not have STIs and does not have sex with anyone else) is a good way to avoid these infections. · Use birth control if you do not want to have children at this time. Talk with your doctor about the choices available and what might be best for you. · Protect your skin from too much sun. When you're outdoors from 10 a.m. to 4 p.m., stay in the shade or cover up with clothing and a hat with a wide brim. Wear sunglasses that block UV rays. Even when it's cloudy, put broad-spectrum sunscreen (SPF 30 or higher) on any exposed skin. · See a dentist one or two times a year for checkups and to have your teeth cleaned. · Wear a seat belt in the car. · Drink alcohol in moderation, if at all. That means no more than 2 drinks a day for men and 1 drink a day for women. Follow your doctor's advice about when to have certain tests. These tests can spot problems early. For everyone · Cholesterol. Have the fat (cholesterol) in your blood tested after age 21. Your doctor will tell you how often to have this done based on your age, family history, or other things that can increase your risk for heart disease. · Blood pressure. Have your blood pressure checked during a routine doctor visit. Your doctor will tell you how often to check your blood pressure based on your age, your blood pressure results, and other factors. · Vision. Talk with your doctor about how often to have a glaucoma test. 
· Diabetes. Ask your doctor whether you should have tests for diabetes. · Colon cancer. Have a test for colon cancer at age 48. You may have one of several tests.  If you are younger than 48, you may need a test earlier if you have any risk factors. Risk factors include whether you already had a precancerous polyp removed from your colon or whether your parent, brother, sister, or child has had colon cancer. For women · Breast exam and mammogram. Talk to your doctor about when you should have a clinical breast exam and a mammogram. Medical experts differ on whether and how often women under 50 should have these tests. Your doctor can help you decide what is right for you. · Pap test and pelvic exam. Begin Pap tests at age 24. A Pap test is the best way to find cervical cancer. The test often is part of a pelvic exam. Ask how often to have this test. 
· Tests for sexually transmitted infections (STIs). Ask whether you should have tests for STIs. You may be at risk if you have sex with more than one person, especially if your partners do not wear condoms. For men · Tests for sexually transmitted infections (STIs). Ask whether you should have tests for STIs. You may be at risk if you have sex with more than one person, especially if you do not wear a condom. · Testicular cancer exam. Ask your doctor whether you should check your testicles regularly. · Prostate exam. Talk to your doctor about whether you should have a blood test (called a PSA test) for prostate cancer. Experts differ on whether and when men should have this test. Some experts suggest it if you are older than 39 and are -American or have a father or brother who got prostate cancer when he was younger than 72. When should you call for help? Watch closely for changes in your health, and be sure to contact your doctor if you have any problems or symptoms that concern you. Where can you learn more? Go to http://behzad-rodo.info/. Enter P072 in the search box to learn more about \"Well Visit, Ages 25 to 48: Care Instructions. \" Current as of: July 19, 2016 Content Version: 11.3 © 4414-5059 Healthwise, Incorporated. Care instructions adapted under license by [employer] (which disclaims liability or warranty for this information). If you have questions about a medical condition or this instruction, always ask your healthcare professional. [address] any warranty or liability for your use of this information. Introducing [facility]! Dear Bessie Walker: 
Thank you for requesting a Checkd.In account. Our records indicate that you already have an active Checkd.In account. You can access your account anytime at [email or web address] Shopping Cart [email or web address]. [employer] Software Did you know that you can access your hospital and ER discharge instructions at any time in Checkd.In? You can also review all of your test results from your hospital stay or ER visit. Additional Information If you have questions, please visit the Frequently Asked Questions section of the Checkd.In website at [email or web address]pping [employer] [email or web address]/. Remember, Checkd.In is NOT to be used for urgent needs. For medical emergencies, dial 911. Now available from your iPhone and Android! Please provide this summary of care documentation to your next provider. Your primary care clinician is listed as [address] [address], [facility]/s . If you have any questions after today's visit, please call [phone].

## 2017-10-24 NOTE — PATIENT INSTRUCTIONS
Vaccine Information Statement    Influenza (Flu) Vaccine (Inactivated or Recombinant): What you need to know    Many Vaccine Information Statements are available in Faroese and other languages. See www.immunize.org/vis  Hojas de Información Sobre Vacunas están disponibles en Español y en muchos otros idiomas. Visite www.immunize.org/vis    1. Why get vaccinated? Influenza (flu) is a contagious disease that spreads around the [place] every year, usually between October and May. Flu is caused by influenza viruses, and is spread mainly by coughing, sneezing, and close contact. Anyone can get flu. Flu strikes suddenly and can last several days. Symptoms vary by age, but can include:   fever/chills   sore throat   muscle aches   fatigue   cough   headache    runny or stuffy nose    Flu can also lead to pneumonia and blood infections, and cause diarrhea and seizures in children. If you have a medical condition, such as heart or lung disease, flu can make it worse. Flu is more dangerous for some people. Infants and young children, people 72years of age and older, pregnant women, and people with certain health conditions or a weakened immune system are at greatest risk. Each year thousands of people in [facility] die from flu, and many more are hospitalized. Flu vaccine can:   keep you from getting flu,   make flu less severe if you do get it, and   keep you from spreading flu to your family and other people. 2. Inactivated and recombinant flu vaccines    A dose of flu vaccine is recommended every flu season. Children 6 months through 6years of age may need two doses during the same flu season. Everyone else needs only one dose each flu season.        Some inactivated flu vaccines contain a very small amount of a mercury-based preservative called thimerosal. Studies have not shown thimerosal in vaccines to be harmful, but flu vaccines that do not contain thimerosal are available. There is no live flu virus in flu shots. They cannot cause the flu. There are many flu viruses, and they are always changing. Each year a new flu vaccine is made to protect against three or four viruses that are likely to cause disease in the upcoming flu season. But even when the vaccine doesnt exactly match these viruses, it may still provide some protection    Flu vaccine cannot prevent:   flu that is caused by a virus not covered by the vaccine, or   illnesses that look like flu but are not. It takes about 2 weeks for protection to develop after vaccination, and protection lasts through the flu season. 3. Some people should not get this vaccine    Tell the person who is giving you the vaccine:     If you have any severe, life-threatening allergies. If you ever had a life-threatening allergic reaction after a dose of flu vaccine, or have a severe allergy to any part of this vaccine, you may be advised not to get vaccinated. Most, but not all, types of flu vaccine contain a small amount of egg protein.  If you ever had Guillain-Barré Syndrome (also called GBS). Some people with a history of GBS should not get this vaccine. This should be discussed with your doctor.  If you are not feeling well. It is usually okay to get flu vaccine when you have a mild illness, but you might be asked to come back when you feel better. 4. Risks of a vaccine reaction    With any medicine, including vaccines, there is a chance of reactions. These are usually mild and go away on their own, but serious reactions are also possible. Most people who get a flu shot do not have any problems with it.      Minor problems following a flu shot include:    soreness, redness, or swelling where the shot was given     hoarseness   sore, red or itchy eyes   cough   fever   aches   headache   itching   fatigue  If these problems occur, they usually begin soon after the shot and last 1 or 2 days. More serious problems following a flu shot can include the following:     There may be a small increased risk of Guillain-Barré Syndrome (GBS) after inactivated flu vaccine. This risk has been estimated at 1 or 2 additional cases per million people vaccinated. This is much lower than the risk of severe complications from flu, which can be prevented by flu vaccine.  Young children who get the flu shot along with pneumococcal vaccine (PCV13) and/or DTaP vaccine at the same time might be slightly more likely to have a seizure caused by fever. Ask your doctor for more information. Tell your doctor if a child who is getting flu vaccine has ever had a seizure. Problems that could happen after any injected vaccine:      People sometimes faint after a medical procedure, including vaccination. Sitting or lying down for about 15 minutes can help prevent fainting, and injuries caused by a fall. Tell your doctor if you feel dizzy, or have vision changes or ringing in the ears.  Some people get severe pain in the shoulder and have difficulty moving the arm where a shot was given. This happens very rarely.  Any medication can cause a severe allergic reaction. Such reactions from a vaccine are very rare, estimated at about 1 in a million doses, and would happen within a few minutes to a few hours after the vaccination. As with any medicine, there is a very remote chance of a vaccine causing a serious injury or death. The safety of vaccines is always being monitored. For more information, visit: www.cdc.gov/vaccinesafety/    5. What if there is a serious reaction? What should I look for?  Look for anything that concerns you, such as signs of a severe allergic reaction, very high fever, or unusual behavior.     Signs of a severe allergic reaction can include hives, swelling of the face and throat, difficulty breathing, a fast heartbeat, dizziness, and weakness - usually within a few minutes to a few hours after the vaccination. What should I do?  If you think it is a severe allergic reaction or other emergency that cant wait, call 9-1-1 and get the person to the nearest hospital. Otherwise, call your doctor.  Reactions should be reported to the Vaccine Adverse Event Reporting System (VAERS). Your doctor should file this report, or you can do it yourself through  the VAERS web site at www.vaers. [facility].gov, or by calling 1-435.699.4885. VAERS does not give medical advice. 6. The National Vaccine Injury Compensation Program    The [facility] Vaccine Injury Compensation Program (VICP) is a federal program that was created to compensate people who may have been injured by certain vaccines. Persons who believe they may have been injured by a vaccine can learn about the program and about filing a claim by calling 9-831.896.6869 or visiting the [employer] website at www.[facility].gov/vaccinecompensation. There is a time limit to file a claim for compensation. 7. How can I learn more?  Ask your healthcare provider. He or she can give you the vaccine package insert or suggest other sources of information.  Call your local or state health department.  Contact the Centers for Disease Control and Prevention (CDC):  - Call 4-515.515.7467 (1-800-CDC-INFO) or  - Visit CDCs website at www.cdc.gov/flu    Vaccine Information Statement   Inactivated Influenza Vaccine   8/7/2015  [address] [place] [id number]    Department of Health and Human Services  Centers for Disease Control and Prevention    Office Use Only       Well Visit, Ages 25 to 48: Care Instructions  Your Care Instructions  Physical exams can help you stay healthy. Your doctor has checked your overall health and may have suggested ways to take good care of yourself. He or she also may have recommended tests. At home, you can help prevent illness with healthy eating, regular exercise, and other steps.   Follow-up care is a key part of your treatment and safety. Be sure to make and go to all appointments, and call your doctor if you are having problems. It's also a good idea to know your test results and keep a list of the medicines you take. How can you care for yourself at home? · Reach and stay at a healthy weight. This will lower your risk for many problems, such as obesity, diabetes, heart disease, and high blood pressure. · Get at least 30 minutes of physical activity on most days of the week. Walking is a good choice. You also may want to do other activities, such as running, swimming, cycling, or playing tennis or team sports. Discuss any changes in your exercise program with your doctor. · Do not smoke or allow others to smoke around you. If you need help quitting, talk to your doctor about stop-smoking programs and medicines. These can increase your chances of quitting for good. · Talk to your doctor about whether you have any risk factors for sexually transmitted infections (STIs). Having one sex partner (who does not have STIs and does not have sex with anyone else) is a good way to avoid these infections. · Use birth control if you do not want to have children at this time. Talk with your doctor about the choices available and what might be best for you. · Protect your skin from too much sun. When you're outdoors from 10 a.m. to 4 p.m., stay in the shade or cover up with clothing and a hat with a wide brim. Wear sunglasses that block UV rays. Even when it's cloudy, put broad-spectrum sunscreen (SPF 30 or higher) on any exposed skin. · See a dentist one or two times a year for checkups and to have your teeth cleaned. · Wear a seat belt in the car. · Drink alcohol in moderation, if at all. That means no more than 2 drinks a day for men and 1 drink a day for women. Follow your doctor's advice about when to have certain tests. These tests can spot problems early. For everyone  · Cholesterol.  Have the fat (cholesterol) in your blood tested after age 21. Your doctor will tell you how often to have this done based on your age, family history, or other things that can increase your risk for heart disease. · Blood pressure. Have your blood pressure checked during a routine doctor visit. Your doctor will tell you how often to check your blood pressure based on your age, your blood pressure results, and other factors. · Vision. Talk with your doctor about how often to have a glaucoma test.  · Diabetes. Ask your doctor whether you should have tests for diabetes. · Colon cancer. Have a test for colon cancer at age 48. You may have one of several tests. If you are younger than 48, you may need a test earlier if you have any risk factors. Risk factors include whether you already had a precancerous polyp removed from your colon or whether your parent, brother, sister, or child has had colon cancer. For women  · Breast exam and mammogram. Talk to your doctor about when you should have a clinical breast exam and a mammogram. Medical experts differ on whether and how often women under 50 should have these tests. Your doctor can help you decide what is right for you. · Pap test and pelvic exam. Begin Pap tests at age 24. A Pap test is the best way to find cervical cancer. The test often is part of a pelvic exam. Ask how often to have this test.  · Tests for sexually transmitted infections (STIs). Ask whether you should have tests for STIs. You may be at risk if you have sex with more than one person, especially if your partners do not wear condoms. For men  · Tests for sexually transmitted infections (STIs). Ask whether you should have tests for STIs. You may be at risk if you have sex with more than one person, especially if you do not wear a condom. · Testicular cancer exam. Ask your doctor whether you should check your testicles regularly.   · Prostate exam. Talk to your doctor about whether you should have a blood test (called a PSA test) for prostate cancer. Experts differ on whether and when men should have this test. Some experts suggest it if you are older than 39 and are -American or have a father or brother who got prostate cancer when he was younger than 72. When should you call for help? Watch closely for changes in your health, and be sure to contact your doctor if you have any problems or symptoms that concern you. Where can you learn more? Go to http://behzad-rodo.info/. Enter P072 in the search box to learn more about \"Well Visit, Ages 25 to 48: Care Instructions. \"  Current as of: July 19, 2016  Content Version: 11.3  © 9637-1581 [employer]. Care instructions adapted under license by [employer] (which disclaims liability or warranty for this information). If you have questions about a medical condition or this instruction, always ask your healthcare professional. [address] any warranty or liability for your use of this information.

## 2017-10-24 NOTE — PROGRESS NOTES
1. Have you been to the ER, urgent care clinic since your last visit? Hospitalized since your last visit? No    2. Have you seen or consulted any other health care providers outside of the [address] [place] [address] since your last visit? Include any pap smears or colon screening. No     Chief Complaint   Patient presents with    Complete Physical     Not fasting    Patient presents for routine immunizations. Patient denies any symptoms , reactions or allergies that would exclude them from being immunized today. After obtaining written consent, and per verbal orders of Latonia Alvares NP, injection of influenza given. Risks and adverse reactions were discussed and the VIS was given to them. All questions were addressed. Patient was observed for 15 minutes post injection. There were no reactions observed at this time.  Advised patient to call with any concerns or signs and symptoms of adverse reaction.      Sreekanth Ogden LPN

## 2018-05-29 ENCOUNTER — OFFICE VISIT (OUTPATIENT)
Dept: FAMILY MEDICINE CLINIC | Age: 38
End: 2018-05-29

## 2018-05-29 VITALS
BODY MASS INDEX: 37.24 KG/M2 | HEIGHT: 71 IN | RESPIRATION RATE: 18 BRPM | SYSTOLIC BLOOD PRESSURE: 136 MMHG | WEIGHT: 266 LBS | HEART RATE: 64 BPM | DIASTOLIC BLOOD PRESSURE: 81 MMHG | OXYGEN SATURATION: 100 % | TEMPERATURE: 99 F

## 2018-05-29 DIAGNOSIS — N52.9 ERECTILE DYSFUNCTION, UNSPECIFIED ERECTILE DYSFUNCTION TYPE: ICD-10-CM

## 2018-05-29 DIAGNOSIS — E66.9 OBESITY (BMI 30-39.9): ICD-10-CM

## 2018-05-29 DIAGNOSIS — R14.0 ABDOMINAL BLOATING: Primary | ICD-10-CM

## 2018-05-29 RX ORDER — POLYMYXIN B SULFATE AND TRIMETHOPRIM 1; 10000 MG/ML; [USP'U]/ML
SOLUTION OPHTHALMIC
COMMUNITY
Start: 2018-04-01 | End: 2019-11-15 | Stop reason: ALTCHOICE

## 2018-05-29 RX ORDER — SILDENAFIL CITRATE 20 MG/1
40-60 TABLET ORAL
Qty: 30 TAB | Refills: 4 | Status: SHIPPED | OUTPATIENT
Start: 2018-05-29 | End: 2019-05-31 | Stop reason: SDUPTHER

## 2018-05-29 NOTE — MR AVS SNAPSHOT
[address] [facility] 
[phone] Patient: Lori Issa MRN: [id number] Visit Information Date & Time Provider Department Dept. Phone Encounter #  
 5/29/2018 10:15 AM An[facility] [phone] [id number] Follow-up Instructions Return if symptoms worsen or fail to improve. Upcoming Health Maintenance Date Due Influenza Age 5 to Adult 8/1/2018 DTaP/Tdap/Td series (2 - Td) 10/26/2019 Allergies as of 5/29/2018  Review Complete On: 5/29/2018 By: Bala Wilson LPN No Known Allergies Current Immunizations  Reviewed on 12/23/2010 Name Date Influenza Vaccine (Quad) PF 10/24/2017, 10/12/2015 Influenza Vaccine Split 10/26/2009 TDAP Vaccine 10/26/2009 Not reviewed this visit You Were Diagnosed With   
  
 Codes Comments Abdominal bloating    -  Primary ICD-10-CM: R14.0 ICD-9-CM: 073. 3 Vitals BP Pulse Temp Resp Height(growth percentile) Weight(growth percentile) 136/81 (BP 1 Location: Right arm, BP Patient Position: Sitting) 64 99 °F (37.2 °C) (Oral) 18 5' 11\" (1.803 m) 266 lb (120.7 kg) SpO2 BMI Smoking Status 100% 37.1 kg/m2 Former Smoker Vitals History BMI and BSA Data Body Mass Index Body Surface Area  
 37.1 kg/m 2 2.46 m 2 Preferred Pharmacy Pharmacy Name Phone [id number] [address] [facility] [phone] Your Updated Medication List  
  
   
This list is accurate as of 5/29/18 11:17 AM.  Always use your most recent med list.  
  
  
  
  
 albuterol 90 mcg/actuation inhaler Commonly known as:  PROVENTIL HFA, VENTOLIN HFA, PROAIR HFA Take 1-2 Puffs by inhalation every four (4) hours as needed for Wheezing. cyclobenzaprine 10 mg tablet Commonly known as:  FLEXERIL  
 Take 1 Tab by mouth three (3) times daily as needed for Muscle Spasm(s). fluticasone 50 mcg/actuation nasal spray Commonly known as:  Marah Adams 2 Sprays by Both Nostrils route daily. multivitamin tablet Commonly known as:  ONE A DAY Take 1 Tab by mouth daily. sildenafil (antihypertensive) 20 mg tablet Commonly known as:  REVATIO Take 2-3 Tabs by mouth daily as needed. trimethoprim-polymyxin b ophthalmic solution Commonly known as:  POLYTRIM We Performed the Following H. PYLORI ABS, IGG, IGA, IGM [id number] CPT(R)] Follow-up Instructions Return if symptoms worsen or fail to improve. Patient Instructions H. Pylori: About This Test 
What is it? H. pylori tests are used to check for a Helicobacter pylori bacteria infection in the stomach and upper part of the small intestine. H. pylori can cause peptic ulcers. But most people with this type of bacteria in their digestive systems do not get ulcers. Different tests may be used to check for an H. pylori infection. · Blood antibody test. This checks to see if your body has made antibodies to fight H. pylori bacteria. · Urea breath test. It tests your breath to see if you have H. pylori bacteria in your stomach. · Stool antigen test. This test looks for substances in your feces (stool) that trigger the immune system to fight an H. pylori infection. (These substances are called H. pylori antigens.) · Stomach biopsy. A small sample (biopsy) is taken from the lining of your stomach and small intestine. The samples are checked for H. pylori. Why is this test done? H. pylori tests are done to: · Find out if an infection with H. pylori bacteria may be causing an ulcer or irritation of the stomach lining (gastritis). · Find out if treatment for the infection has been successful.  
How can you prepare for the test? 
Blood antibody test 
· You do not need to do anything before you have this test. 
 Urea breath test, stool antigen test, or stomach biopsy · Medicines you take may change the results of these tests. Be sure to tell your doctor about all the prescription and over-the-counter medicines you take. Your doctor may advise you to stop taking some of your medicines. Urea breath test or stomach biopsy · You will be asked to not eat or drink anything for a certain amount of time before your breath test or stomach biopsy. Follow your doctor's instructions about how long you need to avoid eating and drinking before the test. If you are going to have a stomach biopsy, your doctor will give you instructions on how to prepare. What happens during the test? 
Blood antibody test 
· A health professional takes a sample of your blood. Urea breath test 
A breath sample is collected when you blow into a balloon or blow bubbles into a bottle of liquid. The health professional will: · Collect a sample of your breath before the test starts. · Give you a capsule or some water to swallow that contains tagged or radioactive material. 
· Collect more samples of your breath. The samples will be tested to see if they contain material formed when H. pylori comes into contact with the tagged or radioactive material. 
Stool antigen test 
For this test, you may be asked to collect the stool sample at home. To collect the sample, you need to: 
· Pass stool into a dry container. Either solid or liquid stools can be collected. Be careful not to get urine or toilet tissue in with the stool sample. · Replace the container cap. Label the container with your name, your doctor's name, and the date the sample was collected. · Wash your hands well after you collect the sample. · Take the sealed container to your doctor's office or to the lab as soon as you can. Stomach biopsy · A procedure called endoscopy is used to collect samples of tissue from the stomach and the first part of the small intestine.  The tissue samples are tested in a lab to see if they contain H. pylori. Follow-up care is a key part of your treatment and safety. Be sure to make and go to all appointments, and call your doctor if you are having problems. It's also a good idea to keep a list of the medicines you take. Ask your doctor when you can expect to have your test results. Where can you learn more? Go to http://behzad-rodo.info/. Enter J254 in the search box to learn more about \"H. Pylori: About This Test.\" Current as of: October 14, 2016 Content Version: 11.4 © 5124-4225 [employer]. Care instructions adapted under license by [employer] (which disclaims liability or warranty for this information). If you have questions about a medical condition or this instruction, always ask your healthcare professional. [address] any warranty or liability for your use of this information. Introducing [facility]! Dear Clarence Couch: 
Thank you for requesting a OpenEd account. Our records indicate that you already have an active OpenEd account. You can access your account anytime at [email or web address] Did you know that you can access your hospital and ER discharge instructions at any time in OpenEd? You can also review all of your test results from your hospital stay or ER visit. Additional Information If you have questions, please visit the Frequently Asked Questions section of the OpenEd website at [email or web address] Remember, OpenEd is NOT to be used for urgent needs. For medical emergencies, dial 911. Now available from your iPhone and Android! Please provide this summary of care documentation to your next provider. Your primary care clinician is listed as Jus Keturah. If you have any questions after today's visit, please call [phone].

## 2018-05-29 NOTE — PATIENT INSTRUCTIONS
H. Pylori: About This Test  What is it? H. pylori tests are used to check for a Helicobacter pylori bacteria infection in the stomach and upper part of the small intestine. H. pylori can cause peptic ulcers. But most people with this type of bacteria in their digestive systems do not get ulcers. Different tests may be used to check for an H. pylori infection. · Blood antibody test. This checks to see if your body has made antibodies to fight H. pylori bacteria. · Urea breath test. It tests your breath to see if you have H. pylori bacteria in your stomach. · Stool antigen test. This test looks for substances in your feces (stool) that trigger the immune system to fight an H. pylori infection. (These substances are called H. pylori antigens.)  · Stomach biopsy. A small sample (biopsy) is taken from the lining of your stomach and small intestine. The samples are checked for H. pylori. Why is this test done? H. pylori tests are done to:  · Find out if an infection with H. pylori bacteria may be causing an ulcer or irritation of the stomach lining (gastritis). · Find out if treatment for the infection has been successful. How can you prepare for the test?  Blood antibody test  · You do not need to do anything before you have this test.  Urea breath test, stool antigen test, or stomach biopsy  · Medicines you take may change the results of these tests. Be sure to tell your doctor about all the prescription and over-the-counter medicines you take. Your doctor may advise you to stop taking some of your medicines. Urea breath test or stomach biopsy  · You will be asked to not eat or drink anything for a certain amount of time before your breath test or stomach biopsy. Follow your doctor's instructions about how long you need to avoid eating and drinking before the test. If you are going to have a stomach biopsy, your doctor will give you instructions on how to prepare.   What happens during the test?  Blood antibody test  · A health professional takes a sample of your blood. Urea breath test  A breath sample is collected when you blow into a balloon or blow bubbles into a bottle of liquid. The health professional will:  · Collect a sample of your breath before the test starts. · Give you a capsule or some water to swallow that contains tagged or radioactive material.  · Collect more samples of your breath. The samples will be tested to see if they contain material formed when H. pylori comes into contact with the tagged or radioactive material.  Stool antigen test  For this test, you may be asked to collect the stool sample at home. To collect the sample, you need to:  · Pass stool into a dry container. Either solid or liquid stools can be collected. Be careful not to get urine or toilet tissue in with the stool sample. · Replace the container cap. Label the container with your name, your doctor's name, and the date the sample was collected. · Wash your hands well after you collect the sample. · Take the sealed container to your doctor's office or to the lab as soon as you can. Stomach biopsy  · A procedure called endoscopy is used to collect samples of tissue from the stomach and the first part of the small intestine. The tissue samples are tested in a lab to see if they contain H. pylori. Follow-up care is a key part of your treatment and safety. Be sure to make and go to all appointments, and call your doctor if you are having problems. It's also a good idea to keep a list of the medicines you take. Ask your doctor when you can expect to have your test results. Where can you learn more? Go to http://behzad-rodo.info/. Enter N813 in the search box to learn more about \"H. Pylori: About This Test.\"  Current as of: October 14, 2016  Content Version: 11.4  © 1767-9364 [employer].  Care instructions adapted under license by [employer] (which disclaims liability or warranty for this information). If you have questions about a medical condition or this instruction, always ask your healthcare professional. [place] [address] any warranty or liability for your use of this information.

## 2018-05-29 NOTE — PROGRESS NOTES
[facility] Note    Leah Daniels is a 40 y.o. male who was seen in clinic today (5/29/2018). Subjective:  Cardiovascular Review:  The patient has hyperlipidemia and obesity. Diet and Lifestyle: generally follows a low fat low cholesterol diet, generally follows a low sodium diet, exercises regularly, nonsmoker. Patient recently hired a  and is going to the gym routinely. Home BP Monitoring: is not measured at home. Pertinent ROS: no TIA's, no chest pain on exertion, no dyspnea on exertion, no swelling of ankles. Patient's reports wife was recently diagnosed with H. Pylori and was recommended that family being tested. Patient reports increased bloating and constipation. Denies GERD or heartburn. Prior to Admission medications    Medication Sig Start Date End Date Taking? Authorizing Provider   sildenafil, antihypertensive, (REVATIO) 20 mg tablet Take 2-3 Tabs by mouth daily as needed. 5/29/18  Yes Darryle Pulley, NP   fluticasone (FLONASE) 50 mcg/actuation nasal spray 2 Sprays by Both Nostrils route daily. 1/10/17  Yes Darryle Pulley, NP   cyclobenzaprine (FLEXERIL) 10 mg tablet Take 1 Tab by mouth three (3) times daily as needed for Muscle Spasm(s). 1/10/17  Yes Darryle Pulley, NP   albuterol (PROVENTIL HFA, VENTOLIN HFA, PROAIR HFA) 90 mcg/actuation inhaler Take 1-2 Puffs by inhalation every four (4) hours as needed for Wheezing. 1/10/17  Yes Darryle Pulley, NP   multivitamin (ONE A DAY) tablet Take 1 Tab by mouth daily. Yes Historical Provider   trimethoprim-polymyxin b (POLYTRIM) ophthalmic solution  4/1/18   Historical Provider          No Known Allergies        ROS  See HPI    Objective:   Physical Exam   Constitutional: He is oriented to person, place, and time. He appears well-developed and well-nourished. Cardiovascular: Normal rate, regular rhythm and intact distal pulses. Exam reveals no gallop and no friction rub. No murmur heard. Pulmonary/Chest: Effort normal and breath sounds normal. No respiratory distress. Neurological: He is alert and oriented to person, place, and time. Psychiatric: He has a normal mood and affect. His speech is normal and behavior is normal. Thought content normal.   Nursing note and vitals reviewed. Visit Vitals    /81 (BP 1 Location: Right arm, BP Patient Position: Sitting)    Pulse 64    Temp 99 °F (37.2 °C) (Oral)    Resp 18    Ht 5' 11\" (1.803 m)    Wt 266 lb (120.7 kg)    SpO2 100%    BMI 37.1 kg/m2       Assessment & Plan:  Diagnoses and all orders for this visit:    1. Abdominal bloating  -     Check H. PYLORI ABS, IGG, IGA, IGM    2. Obesity (BMI 30-39. 9)  Discussed need for weight loss through diet and exercise. Reviewed decreased caloric intake and increased activity. 3. Erectile dysfunction, unspecified erectile dysfunction type  -    Refill sildenafil, antihypertensive, (REVATIO) 20 mg tablet; Take 2-3 Tabs by mouth daily as needed. I have discussed the diagnosis with the patient and the intended plan as seen in the above orders. The patient has received an after-visit summary along with patient information handout. I have discussed medication side effects and warnings with the patient as well. Follow-up Disposition:  Return if symptoms worsen or fail to improve.         Aleisha Reddy NP

## 2018-05-29 NOTE — PROGRESS NOTES
Chief Complaint   Patient presents with    Follow-up     patient advised to be tested for H. pylori due to wife being recently diagnosed      1. Have you been to the ER, urgent care clinic since your last visit? Hospitalized since your last visit? No    2. Have you seen or consulted any other health care providers outside of the [address] [place] [address] since your last visit? Include any pap smears or colon screening.  No

## 2018-05-30 LAB
H PYLORI IGA SER-ACNC: <9 UNITS (ref 0–8.9)
H PYLORI IGG SER IA-ACNC: <0.8 INDEX VALUE (ref 0–0.79)
H PYLORI IGM SER-ACNC: <9 UNITS (ref 0–8.9)

## 2018-05-31 ENCOUNTER — TELEPHONE (OUTPATIENT)
Dept: FAMILY MEDICINE CLINIC | Age: 38
End: 2018-05-31

## 2018-05-31 NOTE — TELEPHONE ENCOUNTER
PA form for Sildenafil 20 mg 2-3 tablets daily as needed sent to [facility] and its been denied    CaseId:[id number];Status:Denied; Review Type:Prior Auth; Appeal Information: Attention:ATTN: [id number] [id number].  [facility],[place]OHN,[id number] PHJVE:[phone] UYY:[phone];

## 2018-05-31 NOTE — TELEPHONE ENCOUNTER
[phone] (home) [phone] (work)  Attempted to call patient no answer left message to call me back in regards to his medication

## 2018-05-31 NOTE — TELEPHONE ENCOUNTER
Patient needs to pay cash for medication (~$20) and use GoodRx coupon. Do not run through insurance.

## 2019-05-31 DIAGNOSIS — N52.9 ERECTILE DYSFUNCTION, UNSPECIFIED ERECTILE DYSFUNCTION TYPE: ICD-10-CM

## 2019-05-31 RX ORDER — SILDENAFIL CITRATE 20 MG/1
40-60 TABLET ORAL
Qty: 30 TAB | Refills: 0 | Status: SHIPPED | OUTPATIENT
Start: 2019-05-31 | End: 2019-06-29 | Stop reason: SDUPTHER

## 2019-09-10 DIAGNOSIS — N52.9 ERECTILE DYSFUNCTION, UNSPECIFIED ERECTILE DYSFUNCTION TYPE: ICD-10-CM

## 2019-09-10 RX ORDER — SILDENAFIL CITRATE 20 MG/1
40-60 TABLET ORAL
Qty: 30 TAB | Refills: 3 | Status: SHIPPED | OUTPATIENT
Start: 2019-09-10 | End: 2020-10-12

## 2019-09-24 ENCOUNTER — TELEPHONE (OUTPATIENT)
Dept: FAMILY MEDICINE CLINIC | Age: 39
End: 2019-09-24

## 2019-09-24 NOTE — TELEPHONE ENCOUNTER
----- Message from Jeramie Cai sent at 9/24/2019  2:16 PM EDT -----  Regarding: JUAN ALBERTO Regan/Telephone  Patient return call    Caller's first and last name and relationship (if not the patient):      Best contact number(s):[phone]      Whose call is being returned: nurse      Details to clarify the request: Patient is returning your call      Jeramie Cai

## 2019-11-15 ENCOUNTER — OFFICE VISIT (OUTPATIENT)
Dept: FAMILY MEDICINE CLINIC | Age: 39
End: 2019-11-15

## 2019-11-15 VITALS
BODY MASS INDEX: 38.92 KG/M2 | HEIGHT: 71 IN | TEMPERATURE: 98 F | WEIGHT: 278 LBS | RESPIRATION RATE: 18 BRPM | OXYGEN SATURATION: 97 % | SYSTOLIC BLOOD PRESSURE: 125 MMHG | DIASTOLIC BLOOD PRESSURE: 80 MMHG | HEART RATE: 77 BPM

## 2019-11-15 DIAGNOSIS — J45.40 MODERATE PERSISTENT ASTHMA WITHOUT COMPLICATION: ICD-10-CM

## 2019-11-15 DIAGNOSIS — Z23 ENCOUNTER FOR IMMUNIZATION: ICD-10-CM

## 2019-11-15 DIAGNOSIS — E55.9 VITAMIN D DEFICIENCY: ICD-10-CM

## 2019-11-15 DIAGNOSIS — E66.9 OBESITY (BMI 30-39.9): ICD-10-CM

## 2019-11-15 DIAGNOSIS — Z00.00 ROUTINE GENERAL MEDICAL EXAMINATION AT A HEALTH CARE FACILITY: Primary | ICD-10-CM

## 2019-11-15 RX ORDER — BUDESONIDE AND FORMOTEROL FUMARATE DIHYDRATE 80; 4.5 UG/1; UG/1
AEROSOL RESPIRATORY (INHALATION)
Refills: 6 | COMMUNITY
Start: 2019-09-25

## 2019-11-15 NOTE — PATIENT INSTRUCTIONS
Vaccine Information Statement Influenza (Flu) Vaccine (Inactivated or Recombinant): What You Need to Know Many Vaccine Information Statements are available in Romanian and other languages. See www.immunize.org/vis Hojas de información sobre vacunas están disponibles en español y en muchos otros idiomas. Visite www.immunize.org/vis 1. Why get vaccinated? Influenza vaccine can prevent influenza (flu). Flu is a contagious disease that spreads around the [facility] every year, usually between October and May. Anyone can get the flu, but it is more dangerous for some people. Infants and young children, people 72years of age and older, pregnant women, and people with certain health conditions or a weakened immune system are at greatest risk of flu complications. Pneumonia, bronchitis, sinus infections and ear infections are examples of flu-related complications. If you have a medical condition, such as heart disease, cancer or diabetes, flu can make it worse. Flu can cause fever and chills, sore throat, muscle aches, fatigue, cough, headache, and runny or stuffy nose. Some people may have vomiting and diarrhea, though this is more common in children than adults. Each year thousands of people in the [facility] die from flu, and many more are hospitalized. Flu vaccine prevents millions of illnesses and flu-related visits to the doctor each year. 2. Influenza vaccines CDC recommends everyone 10months of age and older get vaccinated every flu season. Children 6 months through 6years of age may need 2 doses during a single flu season. Everyone else needs only 1 dose each flu season. It takes about 2 weeks for protection to develop after vaccination. There are many flu viruses, and they are always changing. Each year a new flu vaccine is made to protect against three or four viruses that are likely to cause disease in the upcoming flu season.  Even when the vaccine doesnt exactly match these viruses, it may still provide some protection. Influenza vaccine does not cause flu. Influenza vaccine may be given at the same time as other vaccines. 3. Talk with your health care provider Tell your vaccine provider if the person getting the vaccine: 
 Has had an allergic reaction after a previous dose of influenza vaccine, or has any severe, life-threatening allergies.  Has ever had Guillain-Barré Syndrome (also called GBS). In some cases, your health care provider may decide to postpone influenza vaccination to a future visit. People with minor illnesses, such as a cold, may be vaccinated. People who are moderately or severely ill should usually wait until they recover before getting influenza vaccine. Your health care provider can give you more information. 4. Risks of a reaction  Soreness, redness, and swelling where shot is given, fever, muscle aches, and headache can happen after influenza vaccine.  There may be a very small increased risk of Guillain-Barré Syndrome (GBS) after inactivated influenza vaccine (the flu shot). Sin Current children who get the flu shot along with pneumococcal vaccine (PCV13), and/or DTaP vaccine at the same time might be slightly more likely to have a seizure caused by fever. Tell your health care provider if a child who is getting flu vaccine has ever had a seizure. People sometimes faint after medical procedures, including vaccination. Tell your provider if you feel dizzy or have vision changes or ringing in the ears. As with any medicine, there is a very remote chance of a vaccine causing a severe allergic reaction, other serious injury, or death. 5. What if there is a serious problem? An allergic reaction could occur after the vaccinated person leaves the clinic.  If you see signs of a severe allergic reaction (hives, swelling of the face and throat, difficulty breathing, a fast heartbeat, dizziness, or weakness), call 9-1-1 and get the person to the nearest hospital. 
 
For other signs that concern you, call your health care provider. Adverse reactions should be reported to the Vaccine Adverse Event Reporting System (VAERS). Your health care provider will usually file this report, or you can do it yourself. Visit the VAERS website at www.vaers. hhs.gov or call 1-233.942.2749. VAERS is only for reporting reactions, and VAERS staff do not give medical advice. 6. The National Vaccine Injury Compensation Program 
 
The [facility] Vaccine Injury Compensation Program (VICP) is a federal program that was created to compensate people who may have been injured by certain vaccines. Visit the VICP website at www.[facility].gov/vaccinecompensation or call 1-120.610.9931 to learn about the program and about filing a claim. There is a time limit to file a claim for compensation. 7. How can I learn more?  Ask your health care provider.  Call your local or state health department.  Contact the Centers for Disease Control and Prevention (CDC): 
- Call 5-141.999.9220 (1-891-HTG-INFO) or 
- Visit CDCs influenza website at www.cdc.gov/flu Vaccine Information Statement (Interim) Inactivated Influenza Vaccine 8/15/2019 
[address] ANJELAamir Tomas Baltazar [id number] [facility] and [employer] Time Centers for Disease Control and Prevention Office Use Only Well Visit, Ages 25 to 48: Care Instructions Your Care Instructions Physical exams can help you stay healthy. Your doctor has checked your overall health and may have suggested ways to take good care of yourself. He or she also may have recommended tests. At home, you can help prevent illness with healthy eating, regular exercise, and other steps. Follow-up care is a key part of your treatment and safety. Be sure to make and go to all appointments, and call your doctor if you are having problems.  It's also a good idea to know your test results and keep a list of the medicines you take. How can you care for yourself at home? · Reach and stay at a healthy weight. This will lower your risk for many problems, such as obesity, diabetes, heart disease, and high blood pressure. · Get at least 30 minutes of physical activity on most days of the week. Walking is a good choice. You also may want to do other activities, such as running, swimming, cycling, or playing tennis or team sports. Discuss any changes in your exercise program with your doctor. · Do not smoke or allow others to smoke around you. If you need help quitting, talk to your doctor about stop-smoking programs and medicines. These can increase your chances of quitting for good. · Talk to your doctor about whether you have any risk factors for sexually transmitted infections (STIs). Having one sex partner (who does not have STIs and does not have sex with anyone else) is a good way to avoid these infections. · Use birth control if you do not want to have children at this time. Talk with your doctor about the choices available and what might be best for you. · Protect your skin from too much sun. When you're outdoors from 10 a.m. to 4 p.m., stay in the shade or cover up with clothing and a hat with a wide brim. Wear sunglasses that block UV rays. Even when it's cloudy, put broad-spectrum sunscreen (SPF 30 or higher) on any exposed skin. · See a dentist one or two times a year for checkups and to have your teeth cleaned. · Wear a seat belt in the car. Follow your doctor's advice about when to have certain tests. These tests can spot problems early. For everyone · Cholesterol. Have the fat (cholesterol) in your blood tested after age 21. Your doctor will tell you how often to have this done based on your age, family history, or other things that can increase your risk for heart disease. · Blood pressure.  Have your blood pressure checked during a routine doctor visit. Your doctor will tell you how often to check your blood pressure based on your age, your blood pressure results, and other factors. · Vision. Talk with your doctor about how often to have a glaucoma test. 
· Diabetes. Ask your doctor whether you should have tests for diabetes. · Colon cancer. Your risk for colorectal cancer gets higher as you get older. Some experts say that adults should start regular screening at age 48 and stop at age 76. Others say to start before age 48 or continue after age 76. Talk with your doctor about your risk and when to start and stop screening. For women · Breast exam and mammogram. Talk to your doctor about when you should have a clinical breast exam and a mammogram. Medical experts differ on whether and how often women under 50 should have these tests. Your doctor can help you decide what is right for you. · Cervical cancer screening test and pelvic exam. Begin with a Pap test at age 24. The test often is part of a pelvic exam. Starting at age 27, you may choose to have a Pap test, an HPV test, or both tests at the same time (called co-testing). Talk with your doctor about how often to have testing. · Tests for sexually transmitted infections (STIs). Ask whether you should have tests for STIs. You may be at risk if you have sex with more than one person, especially if your partners do not wear condoms. For men · Tests for sexually transmitted infections (STIs). Ask whether you should have tests for STIs. You may be at risk if you have sex with more than one person, especially if you do not wear a condom. · Testicular cancer exam. Ask your doctor whether you should check your testicles regularly. · Prostate exam. Talk to your doctor about whether you should have a blood test (called a PSA test) for prostate cancer.  Experts differ on whether and when men should have this test. Some experts suggest it if you are older than 39 and are -American or have a father or brother who got prostate cancer when he was younger than 72. When should you call for help? Watch closely for changes in your health, and be sure to contact your doctor if you have any problems or symptoms that concern you. Where can you learn more? Go to http://behzad-rodo.info/. Enter P072 in the search box to learn more about \"Well Visit, Ages 25 to 48: Care Instructions. \" Current as of: December 13, 2018 Content Version: 12.2 © 3326-7591 [employer]. Care instructions adapted under license by [employer] (which disclaims liability or warranty for this information). If you have questions about a medical condition or this instruction, always ask your healthcare professional. [address] any warranty or liability for your use of this information.

## 2019-11-15 NOTE — PROGRESS NOTES
[facility] Note    Lanny Plaza is a 44 y.o. male who was seen in clinic today (11/15/2019). Subjective:  Cardiovascular Review:  The patient has hyperlipidemia and obesity. Diet and Lifestyle: generally follows a low fat low cholesterol diet, generally follows a low sodium diet, exercises regularly, nonsmoker. Patient recently hired a  and is going to the gym routinely. Home BP Monitoring: is not measured at home. Pertinent ROS: no TIA's, no chest pain on exertion, no dyspnea on exertion, no swelling of ankles. Patient seen by pulmonology for asthma. Currently taking Symbicort. Patient is allergic to tree, grass and cat. Prior to Admission medications    Medication Sig Start Date End Date Taking? Authorizing Provider   SYMBICORT 80-4.5 mcg/actuation HFAA TAKE 2 PUFFS BY MOUTH TWICE A DAY 9/25/19  Yes Provider, Historical   sildenafil, antihypertensive, (REVATIO) 20 mg tablet TAKE 2-3 TABS BY MOUTH DAILY AS NEEDED (ED). 9/10/19  Yes Stephen Regan, NP   fluticasone (FLONASE) 50 mcg/actuation nasal spray 2 Sprays by Both Nostrils route daily. 1/10/17  Yes Stephen Regan, NP   albuterol (PROVENTIL HFA, VENTOLIN HFA, PROAIR HFA) 90 mcg/actuation inhaler Take 1-2 Puffs by inhalation every four (4) hours as needed for Wheezing. 1/10/17  Yes Stephen Regan, NP   multivitamin (ONE A DAY) tablet Take 1 Tab by mouth daily. Yes Provider, Historical   trimethoprim-polymyxin b (POLYTRIM) ophthalmic solution  4/1/18 11/15/19  Provider, Historical   cyclobenzaprine (FLEXERIL) 10 mg tablet Take 1 Tab by mouth three (3) times daily as needed for Muscle Spasm(s). 1/10/17   Pato Xiao, NP          No Known Allergies        Review of Systems   Constitutional: Negative for malaise/fatigue and weight loss. HENT: Negative for hearing loss. Eyes: Negative for blurred vision. Respiratory: Negative for shortness of breath.     Cardiovascular: Negative for chest pain, palpitations and leg swelling. Gastrointestinal: Negative for abdominal pain, constipation, diarrhea and heartburn. Genitourinary: Negative for frequency and urgency. Musculoskeletal: Negative for back pain, joint pain and myalgias. Neurological: Negative for dizziness, weakness and headaches. Endo/Heme/Allergies: Does not bruise/bleed easily. Psychiatric/Behavioral: Negative for depression. Objective:   Physical Exam   Constitutional: He is oriented to person, place, and time. He appears well-developed and well-nourished. No distress. HENT:   Right Ear: Tympanic membrane and ear canal normal.   Left Ear: Tympanic membrane and ear canal normal.   Nose: No mucosal edema. Right sinus exhibits no maxillary sinus tenderness and no frontal sinus tenderness. Left sinus exhibits no maxillary sinus tenderness and no frontal sinus tenderness. Mouth/Throat: Oropharynx is clear and moist.   Eyes: Pupils are equal, round, and reactive to light. EOM are normal.   Neck: Normal range of motion. Neck supple. No JVD present. Carotid bruit is not present. No thyromegaly present. Cardiovascular: Normal rate, regular rhythm, normal heart sounds and intact distal pulses. Exam reveals no gallop and no friction rub. No murmur heard. Pulmonary/Chest: Effort normal and breath sounds normal. No respiratory distress. He has no decreased breath sounds. He has no wheezes. He has no rhonchi. Abdominal: Soft. Bowel sounds are normal. He exhibits no distension. There is no tenderness. Musculoskeletal: He exhibits no edema. Lymphadenopathy:     He has no cervical adenopathy. Neurological: He is alert and oriented to person, place, and time. Psychiatric: He has a normal mood and affect. His behavior is normal.   Nursing note and vitals reviewed.         Visit Vitals  /80 (BP 1 Location: Right arm, BP Patient Position: Sitting)   Pulse 77   Temp 98 °F (36.7 °C) (Oral)   Resp 18   Ht 5' 11\" (1.803 m)   Wt 278 lb (126.1 kg)   SpO2 97%   BMI 38.77 kg/m²       Assessment & Plan:  Diagnoses and all orders for this visit:    1. Routine general medical examination at a health care facility  Well adult, reviewed routine screenings as well as healthy diet and lifestyle practices  -     LIPID PANEL  -     METABOLIC PANEL, COMPREHENSIVE  -     CBC W/O DIFF  -     TSH AND FREE T4  -     VITAMIN D, 25 HYDROXY    2. Obesity (BMI 30-39. 9)  Discussed need for weight loss through diet and exercise. Reviewed decreased caloric intake and increased activity. 3. Moderate persistent asthma without complication  Stable, managed by pulmonology. No changes. 4. Vitamin D deficiency  -     VITAMIN D, 25 HYDROXY    5. Encounter for immunization  -     INFLUENZA VIRUS VAC QUAD,SPLIT,PRESV FREE SYRINGE IM  -     CO IMMUNIZ ADMIN,1 SINGLE/COMB VAC/TOXOID      I have discussed the diagnosis with the patient and the intended plan as seen in the above orders. The patient has received an after-visit summary along with patient information handout. I have discussed medication side effects and warnings with the patient as well. Follow-up and Dispositions    · Return in about 1 year (around 11/15/2020) for Annual Exam - 30 minutes.            Marcy Guardado NP

## 2019-11-15 NOTE — PROGRESS NOTES
Chief Complaint   Patient presents with    Complete Physical     fasting except cup of coffee with sugar free creamer       Reviewed Record in preparation for visit and have obtained necessary documentation. Identified pt with two pt identifiers (Name @ )    Health Maintenance Due   Topic    Influenza Age 5 to Adult     DTaP/Tdap/Td series (2 - Td)         1. Have you been to the ER, urgent care clinic since your last visit? Hospitalized since your last visit? Went to Urgent care in Nov for uri. 2. Have you seen or consulted any other health care providers outside of the [address] [place] [address] since your last visit? Include any pap smears or colon screening.  No

## 2019-11-16 LAB
25(OH)D3+25(OH)D2 SERPL-MCNC: 25.6 NG/ML (ref 30–100)
ALBUMIN SERPL-MCNC: 4.4 G/DL (ref 3.5–5.5)
ALBUMIN/GLOB SERPL: 1.7 {RATIO} (ref 1.2–2.2)
ALP SERPL-CCNC: 67 IU/L (ref 39–117)
ALT SERPL-CCNC: 31 IU/L (ref 0–44)
AST SERPL-CCNC: 14 IU/L (ref 0–40)
BILIRUB SERPL-MCNC: 0.4 MG/DL (ref 0–1.2)
BUN SERPL-MCNC: 15 MG/DL (ref 6–20)
BUN/CREAT SERPL: 16 (ref 9–20)
CALCIUM SERPL-MCNC: 9.4 MG/DL (ref 8.7–10.2)
CHLORIDE SERPL-SCNC: 102 MMOL/L (ref 96–106)
CHOLEST SERPL-MCNC: 197 MG/DL (ref 100–199)
CO2 SERPL-SCNC: 19 MMOL/L (ref 20–29)
CREAT SERPL-MCNC: 0.93 MG/DL (ref 0.76–1.27)
ERYTHROCYTE [DISTWIDTH] IN BLOOD BY AUTOMATED COUNT: 12.2 % (ref 12.3–15.4)
GLOBULIN SER CALC-MCNC: 2.6 G/DL (ref 1.5–4.5)
GLUCOSE SERPL-MCNC: 91 MG/DL (ref 65–99)
HCT VFR BLD AUTO: 44.1 % (ref 37.5–51)
HDLC SERPL-MCNC: 45 MG/DL
HGB BLD-MCNC: 14.6 G/DL (ref 13–17.7)
INTERPRETATION, 910389: NORMAL
LDLC SERPL CALC-MCNC: 130 MG/DL (ref 0–99)
MCH RBC QN AUTO: 29 PG (ref 26.6–33)
MCHC RBC AUTO-ENTMCNC: 33.1 G/DL (ref 31.5–35.7)
MCV RBC AUTO: 88 FL (ref 79–97)
PLATELET # BLD AUTO: 338 X10E3/UL (ref 150–450)
POTASSIUM SERPL-SCNC: 4.9 MMOL/L (ref 3.5–5.2)
PROT SERPL-MCNC: 7 G/DL (ref 6–8.5)
RBC # BLD AUTO: 5.04 X10E6/UL (ref 4.14–5.8)
SODIUM SERPL-SCNC: 138 MMOL/L (ref 134–144)
T4 FREE SERPL-MCNC: 1.4 NG/DL (ref 0.82–1.77)
TRIGL SERPL-MCNC: 112 MG/DL (ref 0–149)
TSH SERPL DL<=0.005 MIU/L-ACNC: 2.25 UIU/ML (ref 0.45–4.5)
VLDLC SERPL CALC-MCNC: 22 MG/DL (ref 5–40)
WBC # BLD AUTO: 7.7 X10E3/UL (ref 3.4–10.8)

## 2020-01-29 ENCOUNTER — OFFICE VISIT (OUTPATIENT)
Dept: FAMILY MEDICINE CLINIC | Age: 40
End: 2020-01-29

## 2020-01-29 VITALS
SYSTOLIC BLOOD PRESSURE: 142 MMHG | TEMPERATURE: 97.7 F | OXYGEN SATURATION: 97 % | BODY MASS INDEX: 40.18 KG/M2 | DIASTOLIC BLOOD PRESSURE: 98 MMHG | RESPIRATION RATE: 17 BRPM | HEART RATE: 79 BPM | WEIGHT: 287 LBS | HEIGHT: 71 IN

## 2020-01-29 DIAGNOSIS — R03.0 TRANSIENT ELEVATED BLOOD PRESSURE: Primary | ICD-10-CM

## 2020-01-29 NOTE — PROGRESS NOTES
Chief Complaint   Patient presents with    Hypertension     150/100 this afternoon      1. Have you been to the ER, urgent care clinic since your last visit? Hospitalized since your last visit? No    2. Have you seen or consulted any other health care providers outside of the [address] [place] [address] since your last visit? Include any pap smears or colon screening.  No

## 2020-01-29 NOTE — PATIENT INSTRUCTIONS
Home Blood Pressure Test: About This Test 
What is it? A home blood pressure test allows you to keep track of your blood pressure at home. Blood pressure is a measure of the force of blood against the walls of your arteries. Blood pressure readings include two numbers, such as 130/80 (say \"130 over 80\"). The first number is the systolic pressure. The second number is the diastolic pressure. Why is this test done? You may do this test at home to: · Find out if you have high blood pressure. · Track your blood pressure if you have high blood pressure. · Track how well medicine is working to reduce high blood pressure. · Check how lifestyle changes, such as weight loss and exercise, are affecting blood pressure. How can you prepare for the test? 
· Do not use caffeine, tobacco, or medicines known to raise blood pressure (such as nasal decongestant sprays) for at least 30 minutes before taking your blood pressure. · Do not exercise for at least 30 minutes before taking your blood pressure. What happens before the test? 
Take your blood pressure while you feel comfortable and relaxed. Sit quietly with both feet on the floor for at least 5 minutes before the test. 
What happens during the test? 
· Sit with your arm slightly bent and resting on a table so that your upper arm is at the same level as your heart. · Roll up your sleeve or take off your shirt to expose your upper arm. · Wrap the blood pressure cuff around your upper arm so that the lower edge of the cuff is about 1 inch above the bend of your elbow. Proceed with the following steps depending on if you are using an automatic or manual pressure monitor. Automatic blood pressure monitors · Press the on/off button on the automatic monitor and wait until the ready-to-measure \"heart\" symbol appears next to zero in the display window. · Press the start button. The cuff will inflate and deflate by itself. · Your blood pressure numbers will appear on the screen. · Write your numbers in your log book, along with the date and time. Manual blood pressure monitors · Place the earpieces of a stethoscope in your ears, and place the bell of the stethoscope over the artery, just below the cuff. · Close the valve on the rubber inflating bulb. · Squeeze the bulb rapidly with your opposite hand to inflate the cuff until the dial or column of mercury reads about 30 mm Hg higher than your usual systolic pressure. If you do not know your usual pressure, inflate the cuff to 210 mm Hg or until the pulse at your wrist disappears. · Open the pressure valve just slightly by twisting or pressing the valve on the bulb. · As you watch the pressure slowly fall, note the level on the dial at which you first start to hear a pulsing or tapping sound through the stethoscope. This is your systolic blood pressure. · Continue letting the air out slowly. The sounds will become muffled and will finally disappear. Note the pressure when the sounds completely disappear. This is your diastolic blood pressure. Let out all the remaining air. · Write your numbers in your log book, along with the date and time. What else should you know about the test? 
It is more accurate to take the average of several readings made throughout the day than to rely on a single reading. It's normal for blood pressure to go up and down throughout the day. Follow-up care is a key part of your treatment and safety. Be sure to make and go to all appointments, and call your doctor if you are having problems. It's also a good idea to keep a list of the medicines you take. Where can you learn more? Go to http://behzad-rodo.info/. Enter C427 in the search box to learn more about \"Home Blood Pressure Test: About This Test.\" Current as of: April 9, 2019 Content Version: 12.2 © 4393-2434 [employer] [facility] [employer].  Care instructions adapted under license by [address] (which disclaims liability or warranty for this information). If you have questions about a medical condition or this instruction, always ask your healthcare professional. [address] any warranty or liability for your use of this information.

## 2020-01-29 NOTE — PROGRESS NOTES
HPI  Mihai Rinaldi [place] 44 y.o. male  presents to the office today to discuss high BP. Pt usually sees NP Jass. Blood pressure (!) 142/98, pulse 79, temperature 97.7 °F (36.5 °C), temperature source Oral, resp. rate 17, height 5' 11\" (1.803 m), weight 287 lb (130.2 kg), SpO2 97 %. Body mass index is 40.03 kg/m². Chief Complaint   Patient presents with    Hypertension     150/100 this afternoon       Pt has been under care of pulmonary Dr. Kenneth Shin for asthma. In addition to inhaler use and oral steroids, pt also started receiving monthly Fasenra injections. Pt states that after receiving the first dose of Fasenra injection in Nov, his BP appeared to be elevated prior to injection (did not specify numbers). Most recent injection was on 12/31/19, and his BP also elevated prior to injection (did not specify numbers). Pt notes that his normal BP readings are around 120-130/80-90, but this PM his BP was 150/100 at home. Today in office his BP is 142/96 and 142/98 on manual recheck. Of note, pt's weight increased from 266 lbs in May to 287 lbs today in office. I have reviewed/discussed the above normal BMI with the patient. Current Outpatient Medications   Medication Sig Dispense Refill    SYMBICORT 80-4.5 mcg/actuation HFAA TAKE 2 PUFFS BY MOUTH TWICE A DAY  6    sildenafil, antihypertensive, (REVATIO) 20 mg tablet TAKE 2-3 TABS BY MOUTH DAILY AS NEEDED (ED). 30 Tab 3    fluticasone (FLONASE) 50 mcg/actuation nasal spray 2 Sprays by Both Nostrils route daily. 1 Bottle 5    albuterol (PROVENTIL HFA, VENTOLIN HFA, PROAIR HFA) 90 mcg/actuation inhaler Take 1-2 Puffs by inhalation every four (4) hours as needed for Wheezing. 1 Inhaler 2    multivitamin (ONE A DAY) tablet Take 1 Tab by mouth daily.        No Known Allergies  Past Medical History:   Diagnosis Date    Hepatic steatosis 2014    confirmed by US    Moderate persistent asthma without complication 57/46/9071    Obesity      Past Surgical History: Procedure Laterality Date    HX TONSILLECTOMY       Family History   Problem Relation Age of Onset    Hypertension Father      Social History     Tobacco Use    Smoking status: Former Smoker     Last attempt to quit: 2004     Years since quittin.0    Smokeless tobacco: Never Used   Substance Use Topics    Alcohol use: Yes     Comment: social         Review of Systems   Constitutional: Negative for chills and fever. HENT: Negative for hearing loss and tinnitus. Eyes: Negative for blurred vision and double vision. Respiratory: Negative for cough and shortness of breath. Cardiovascular: Negative for chest pain and palpitations. Gastrointestinal: Negative for nausea and vomiting. Genitourinary: Negative for dysuria and frequency. Musculoskeletal: Negative for back pain and falls. Skin: Negative for itching and rash. Neurological: Negative for dizziness, loss of consciousness and headaches. Psychiatric/Behavioral: Negative for depression. The patient is not nervous/anxious. Physical Exam  Vitals signs and nursing note reviewed. Constitutional:       Appearance: Normal appearance. He is well-developed. HENT:      Head: Normocephalic and atraumatic. Right Ear: External ear normal.      Left Ear: External ear normal.      Nose: Nose normal.   Eyes:      Conjunctiva/sclera: Conjunctivae normal.      Pupils: Pupils are equal, round, and reactive to light. Neck:      Musculoskeletal: Normal range of motion and neck supple. Cardiovascular:      Rate and Rhythm: Normal rate and regular rhythm. Pulses: Normal pulses. Heart sounds: Normal heart sounds. Pulmonary:      Effort: Pulmonary effort is normal.      Breath sounds: Normal breath sounds. Abdominal:      General: Bowel sounds are normal.      Palpations: Abdomen is soft. Musculoskeletal: Normal range of motion. Skin:     General: Skin is warm and dry.    Neurological:      Mental Status: He is alert and oriented to person, place, and time. Psychiatric:         Speech: Speech normal.         Behavior: Behavior normal.         Thought Content: Thought content normal.         Judgment: Judgment normal.           ASSESSMENT and PLAN  Diagnoses and all orders for this visit:    1. Transient elevated blood pressure  BP is not at goal today in office. Pt is at risk for HTN. Advised pt to facilitate dietary change and exercise, as managing his weight is very important in keeping his BP under control. Also advised pt to check his BP regularly at home and record readings into flowsheet on [employer]. Will reassess BP and review flowsheet in 2 weeks, if readings do not improve, we might need to discuss further interventions such as medication treatment.   -     [facility]    Follow-up and Dispositions    · Return in about 2 weeks (around 2/12/2020) for blood pressure check. Medication risks/benefits/costs/interactions/alternatives discussed with patient. Advised patient to call back or return to office if symptoms worsen/change/persist.  If patient cannot reach us or should anything more severe/urgent arise he/she should proceed directly to the nearest emergency department. Discussed expected course/resolution/complications of diagnosis in detail with patient. Patient given a written after visit summary which includes diagnoses, current medications and vitals. Patient expressed understanding with the diagnosis and plan. Written by saira Kirk, as dictated by Evert Ojeda M.D.    4:09 PM - 4:26 PM    Total time spent with the patient 17 minutes, greater than 50% of time spent counseling patient.

## 2020-03-25 ENCOUNTER — OFFICE VISIT (OUTPATIENT)
Dept: FAMILY MEDICINE CLINIC | Age: 40
End: 2020-03-25

## 2020-03-25 VITALS
TEMPERATURE: 97.5 F | WEIGHT: 279 LBS | HEART RATE: 100 BPM | BODY MASS INDEX: 39.06 KG/M2 | OXYGEN SATURATION: 97 % | SYSTOLIC BLOOD PRESSURE: 138 MMHG | HEIGHT: 71 IN | DIASTOLIC BLOOD PRESSURE: 88 MMHG | RESPIRATION RATE: 17 BRPM

## 2020-03-25 DIAGNOSIS — R03.0 TRANSIENT ELEVATED BLOOD PRESSURE: Primary | ICD-10-CM

## 2020-03-25 DIAGNOSIS — E66.01 SEVERE OBESITY (HCC): ICD-10-CM

## 2020-03-25 RX ORDER — MONTELUKAST SODIUM 10 MG/1
TABLET ORAL
COMMUNITY
Start: 2020-02-27

## 2020-03-25 NOTE — PATIENT INSTRUCTIONS
Home Blood Pressure Test: About This Test 
What is it? A home blood pressure test allows you to keep track of your blood pressure at home. Blood pressure is a measure of the force of blood against the walls of your arteries. Blood pressure readings include two numbers, such as 130/80 (say \"130 over 80\"). The first number is the systolic pressure. The second number is the diastolic pressure. Why is this test done? You may do this test at home to: · Find out if you have high blood pressure. · Track your blood pressure if you have high blood pressure. · Track how well medicine is working to reduce high blood pressure. · Check how lifestyle changes, such as weight loss and exercise, are affecting blood pressure. How do you prepare for the test? 
For at least 30 minutes before you take your blood pressure, don't exercise or use caffeine, tobacco, or medicines that raise blood pressure. Take your blood pressure while you feel comfortable and relaxed. Sit quietly with both feet on the floor for at least 5 minutes before the test. 
How is the test done? · Sit with your arm slightly bent and resting on a table so that your upper arm is at the same level as your heart. · Roll up your sleeve or take off your shirt to expose your upper arm. · Wrap the blood pressure cuff around your upper arm so that the lower edge of the cuff is about 1 inch above the bend of your elbow. Proceed with the following steps depending on if you are using an automatic or manual pressure monitor. Automatic blood pressure monitors · Press the on/off button on the automatic monitor and wait until the ready-to-measure \"heart\" symbol appears next to zero in the display window. · Press the start button. The cuff will inflate and deflate by itself. · Your blood pressure numbers will appear on the screen. · Write your numbers in your log book, along with the date and time. Manual blood pressure monitors · Place the earpieces of a stethoscope in your ears, and place the bell of the stethoscope over the artery, just below the cuff. · Close the valve on the rubber inflating bulb. · Squeeze the bulb rapidly with your opposite hand to inflate the cuff until the dial or column of mercury reads about 30 mm Hg higher than your usual systolic pressure. If you do not know your usual pressure, inflate the cuff to 210 mm Hg or until the pulse at your wrist disappears. · Open the pressure valve just slightly by twisting or pressing the valve on the bulb. · As you watch the pressure slowly fall, note the level on the dial at which you first start to hear a pulsing or tapping sound through the stethoscope. This is your systolic blood pressure. · Continue letting the air out slowly. The sounds will become muffled and will finally disappear. Note the pressure when the sounds completely disappear. This is your diastolic blood pressure. Let out all the remaining air. · Write your numbers in your log book, along with the date and time. Follow-up care is a key part of your treatment and safety. Be sure to make and go to all appointments, and call your doctor if you are having problems. It's also a good idea to keep a list of the medicines you take. Where can you learn more? Go to http://behzad-rodo.info/ Enter C427 in the search box to learn more about \"Home Blood Pressure Test: About This Test.\" Current as of: December 15, 2019Content Version: 12.4 © 4143-9210 [employer]. Care instructions adapted under license by [employer] (which disclaims liability or warranty for this information). If you have questions about a medical condition or this instruction, always ask your healthcare professional. [address] any warranty or liability for your use of this information. Home Blood Pressure Test: About This Test 
What is it? A home blood pressure test allows you to keep track of your blood pressure at home. Blood pressure is a measure of the force of blood against the walls of your arteries. Blood pressure readings include two numbers, such as 130/80 (say \"130 over 80\"). The first number is the systolic pressure. The second number is the diastolic pressure. Why is this test done? You may do this test at home to: · Find out if you have high blood pressure. · Track your blood pressure if you have high blood pressure. · Track how well medicine is working to reduce high blood pressure. · Check how lifestyle changes, such as weight loss and exercise, are affecting blood pressure. How do you prepare for the test? 
For at least 30 minutes before you take your blood pressure, don't exercise or use caffeine, tobacco, or medicines that raise blood pressure. Take your blood pressure while you feel comfortable and relaxed. Sit quietly with both feet on the floor for at least 5 minutes before the test. 
How is the test done? · Sit with your arm slightly bent and resting on a table so that your upper arm is at the same level as your heart. · Roll up your sleeve or take off your shirt to expose your upper arm. · Wrap the blood pressure cuff around your upper arm so that the lower edge of the cuff is about 1 inch above the bend of your elbow. Proceed with the following steps depending on if you are using an automatic or manual pressure monitor. Automatic blood pressure monitors · Press the on/off button on the automatic monitor and wait until the ready-to-measure \"heart\" symbol appears next to zero in the display window. · Press the start button. The cuff will inflate and deflate by itself. · Your blood pressure numbers will appear on the screen. · Write your numbers in your log book, along with the date and time. Manual blood pressure monitors · Place the earpieces of a stethoscope in your ears, and place the bell of the stethoscope over the artery, just below the cuff. · Close the valve on the rubber inflating bulb. · Squeeze the bulb rapidly with your opposite hand to inflate the cuff until the dial or column of mercury reads about 30 mm Hg higher than your usual systolic pressure. If you do not know your usual pressure, inflate the cuff to 210 mm Hg or until the pulse at your wrist disappears. · Open the pressure valve just slightly by twisting or pressing the valve on the bulb. · As you watch the pressure slowly fall, note the level on the dial at which you first start to hear a pulsing or tapping sound through the stethoscope. This is your systolic blood pressure. · Continue letting the air out slowly. The sounds will become muffled and will finally disappear. Note the pressure when the sounds completely disappear. This is your diastolic blood pressure. Let out all the remaining air. · Write your numbers in your log book, along with the date and time. Follow-up care is a key part of your treatment and safety. Be sure to make and go to all appointments, and call your doctor if you are having problems. It's also a good idea to keep a list of the medicines you take. Where can you learn more? Go to http://behzad-rodo.info/ Enter C427 in the search box to learn more about \"Home Blood Pressure Test: About This Test.\" Current as of: December 15, 2019Content Version: 12.4 © 5489-1749 [employer], Incorporated. Care instructions adapted under license by [employer] (which disclaims liability or warranty for this information). If you have questions about a medical condition or this instruction, always ask your healthcare professional. [address] any warranty or liability for your use of this information.

## 2020-03-25 NOTE — PROGRESS NOTES
HPI  Mihai Jocelin Johansen 44 y.o. male  presents to the office today for follow up on blood pressure. Blood pressure 138/88, pulse 100, temperature 97.5 °F (36.4 °C), temperature source Oral, resp. rate 17, height 5' 11\" (1.803 m), weight 279 lb (126.6 kg), SpO2 97 %. Body mass index is 38.91 kg/m². Chief Complaint   Patient presents with    Hypertension     follow up    Headache      Transient elevated BP: BP at office today 130/96 and 138/88 on manual recheck. Pt states that his BP readings have been fluctuating, the past few days readings tend to be higher, but pt thinks due to concern over coronavirus outbreak. Pt uses a wrist monitor at home. Pt notes that when he went to get asthma shot recently, his BP was 149/100. Pt endorses that he has been trying to lose weight but BP does not improve much. Pt wants to hold off on medication treatment at this time and proceed with more natural ways such as exercising and stress management. Admits that this year has been really stressful for him. In addition his asthma has been rather bad this year. Endorses FHx of HTN. Obesity: I have reviewed/discussed the above normal BMI with the patient. Pt's weight went down from 287 lbs in Jan to 279 lbs today in office. Current Outpatient Medications   Medication Sig Dispense Refill    montelukast (SINGULAIR) 10 mg tablet       SYMBICORT 80-4.5 mcg/actuation HFAA TAKE 2 PUFFS BY MOUTH TWICE A DAY  6    sildenafil, antihypertensive, (REVATIO) 20 mg tablet TAKE 2-3 TABS BY MOUTH DAILY AS NEEDED (ED). 30 Tab 3    fluticasone (FLONASE) 50 mcg/actuation nasal spray 2 Sprays by Both Nostrils route daily. 1 Bottle 5    albuterol (PROVENTIL HFA, VENTOLIN HFA, PROAIR HFA) 90 mcg/actuation inhaler Take 1-2 Puffs by inhalation every four (4) hours as needed for Wheezing. 1 Inhaler 2    multivitamin (ONE A DAY) tablet Take 1 Tab by mouth daily.        No Known Allergies  Past Medical History:   Diagnosis Date    Hepatic steatosis     confirmed by [address] [facility] [address]    Moderate persistent asthma without complication     Obesity      Past Surgical History:   Procedure Laterality Date    HX TONSILLECTOMY       Family History   Problem Relation Age of Onset    Hypertension Father      Social History     Tobacco Use    Smoking status: Former Smoker     Last attempt to quit: 2004     Years since quittin.2    Smokeless tobacco: Never Used   Substance Use Topics    Alcohol use: Yes     Comment: social         Review of Systems   Constitutional: Negative for chills and fever. HENT: Negative for hearing loss and tinnitus. Eyes: Negative for blurred vision and double vision. Respiratory: Negative for cough and shortness of breath. Cardiovascular: Negative for chest pain and palpitations. Gastrointestinal: Negative for nausea and vomiting. Genitourinary: Negative for dysuria and frequency. Musculoskeletal: Negative for back pain and falls. Skin: Negative for itching and rash. Neurological: Negative for dizziness, loss of consciousness and headaches. Psychiatric/Behavioral: Negative for depression. The patient is not nervous/anxious. Physical Exam  Vitals signs and nursing note reviewed. Constitutional:       Appearance: Normal appearance. He is well-developed. HENT:      Head: Normocephalic and atraumatic. Right Ear: External ear normal.      Left Ear: External ear normal.      Nose: Nose normal.   Eyes:      Conjunctiva/sclera: Conjunctivae normal.      Pupils: Pupils are equal, round, and reactive to light. Neck:      Musculoskeletal: Normal range of motion and neck supple. Cardiovascular:      Rate and Rhythm: Normal rate and regular rhythm. Pulses: Normal pulses. Heart sounds: Normal heart sounds. Pulmonary:      Effort: Pulmonary effort is normal.      Breath sounds: Normal breath sounds. Abdominal:      General: Bowel sounds are normal.      Palpations: Abdomen is soft. Musculoskeletal: Normal range of motion. Skin:     General: Skin is warm and dry. Neurological:      Mental Status: He is alert and oriented to person, place, and time. Psychiatric:         Speech: Speech normal.         Behavior: Behavior normal.         Thought Content: Thought content normal.         Judgment: Judgment normal.           ASSESSMENT and PLAN  Diagnoses and all orders for this visit:    1. Transient elevated blood pressure  BP improved upon manual recheck. Upon our discussion, I believe that stress most likely contributes to his elevated BP. Advised pt to obtain upper arm BP monitor since it will be more accurate than the wrist monitor; went over proper techniques to obtain accurate readings. At this time we will hold off on medication and continue to monitor BP closely. 2. Severe obesity ([address]  I have reviewed/discussed the above normal BMI with the patient. I have recommended the following interventions: dietary management education, guidance, and counseling, encourage exercise and monitor weight . Follow-up and Dispositions    · Return in about 4 weeks (around 4/22/2020) for hypertension follow up. Medication risks/benefits/costs/interactions/alternatives discussed with patient. Advised patient to call back or return to office if symptoms worsen/change/persist.  If patient cannot reach us or should anything more severe/urgent arise he/she should proceed directly to the nearest emergency department. Discussed expected course/resolution/complications of diagnosis in detail with patient. Patient given a written after visit summary which includes diagnoses, current medications and vitals. Patient expressed understanding with the diagnosis and plan. Written by saira Solis, as dictated by Catalino Khoury M.D.    2:09 PM - 2:26 PM    Total time spent with the patient 17 minutes, greater than 50% of time spent counseling patient.

## 2020-03-25 NOTE — PROGRESS NOTES
Chief Complaint   Patient presents with    Hypertension     follow up    Headache     1. Have you been to the ER, urgent care clinic since your last visit? Hospitalized since your last visit? No    2. Have you seen or consulted any other health care providers outside of the [address] [place] [address] since your last visit? Include any pap smears or colon screening.  No

## 2020-04-09 ENCOUNTER — TELEPHONE (OUTPATIENT)
Dept: FAMILY MEDICINE CLINIC | Age: 40
End: 2020-04-09

## 2020-04-15 NOTE — TELEPHONE ENCOUNTER
Prior authorization is denied. Patient has to pay out of pocket. Patient was informed via [employer] and was mailed an [employer] coupon. As noted in patient's chart by provider Laura Mora NP \" patient would need to pay out of pocket, due to off labeled use for medication. \"    Garfield Haines LPN

## 2020-04-16 ENCOUNTER — TELEPHONE (OUTPATIENT)
Dept: FAMILY MEDICINE CLINIC | Age: 40
End: 2020-04-16

## 2020-04-16 NOTE — TELEPHONE ENCOUNTER
Outbound call placed to patient. name and  verified. Call placed to reschedule patients appointment due to COVID-19 pandemic.  Patient scheduled

## 2020-04-22 ENCOUNTER — VIRTUAL VISIT (OUTPATIENT)
Dept: FAMILY MEDICINE CLINIC | Age: 40
End: 2020-04-22

## 2020-04-22 ENCOUNTER — E-VISIT (OUTPATIENT)
Dept: FAMILY MEDICINE CLINIC | Age: 40
End: 2020-04-22

## 2020-04-22 VITALS — DIASTOLIC BLOOD PRESSURE: 80 MMHG | SYSTOLIC BLOOD PRESSURE: 126 MMHG

## 2020-04-22 DIAGNOSIS — E66.9 OBESITY (BMI 30-39.9): ICD-10-CM

## 2020-04-22 DIAGNOSIS — R03.0 TRANSIENT ELEVATED BLOOD PRESSURE: Primary | ICD-10-CM

## 2020-04-22 NOTE — PROGRESS NOTES
Akiko Hernandez is a 44 y.o. male who was seen by synchronous (real-time) audio-video technology on 4/22/2020. Consent:  Services were provided through a video synchronous discussion virtually to substitute for in-person appointment. He and/or his healthcare decision maker is aware that this patient-initiated Telehealth encounter is a billable service, with coverage as determined by his insurance carrier. He is aware that he may receive a bill and has provided verbal consent to proceed: Yes    I was in the office while conducting this encounter. Subjective:   Akiko Hernandez was seen for general check up    Elevated blood pressure: Pt reports that blood pressure has been good, and has \"random high peaks\" close to 140-150 at times. Pt believes the random high peaks of bp likely occurred from lack of sleep. This past week, he states that readings have been around 130/135. This morning, he reported that he used his wrist monitor and had bp reading of 145/179. On the Omron device he uses, pt reports that bp was 130/185. He is not taking medications at this time. Pt states he is working on his diet and lifestyle first, and will continue to monitor bp this month. Obesity:   Pt reports that he exercises at least 30min a day by jogging or brisk walking. He jogged/walked 20 min today, lost two pounds since last visit from last month. Pt notes that he drinks beetroot juice and coconut water because he read they containhigh health benefits. I have reviewed/discussed the above normal BMI with the patient. I have recommended the following interventions: dietary management education, guidance, and counseling, encourage exercise and monitor weight. Pt notes that he felt some dizziness yesterday, but bp was good at 130/185. Pt thinks it may be allergies. Advised to remain well hydrated and caution against outdoor exposure. Prior to Admission medications    Medication Sig Start Date End Date Taking?  Authorizing Provider montelukast (SINGULAIR) 10 mg tablet  20   Provider, Historical   SYMBICORT 80-4.5 mcg/actuation HFAA TAKE 2 PUFFS BY MOUTH TWICE A DAY 19   Provider, Historical   sildenafil, antihypertensive, (REVATIO) 20 mg tablet TAKE 2-3 TABS BY MOUTH DAILY AS NEEDED (ED). 9/10/19   Severo Begun, NP   fluticasone (FLONASE) 50 mcg/actuation nasal spray 2 Sprays by Both Nostrils route daily. 1/10/17   Severo Begun, NP   albuterol (PROVENTIL HFA, VENTOLIN HFA, PROAIR HFA) 90 mcg/actuation inhaler Take 1-2 Puffs by inhalation every four (4) hours as needed for Wheezing. 1/10/17   Severo Begun, NP   multivitamin (ONE A DAY) tablet Take 1 Tab by mouth daily. Provider, Historical     No Known Allergies  Past Medical History:   Diagnosis Date    Hepatic steatosis     confirmed by US    Moderate persistent asthma without complication     Obesity      Past Surgical History:   Procedure Laterality Date    HX TONSILLECTOMY       Family History   Problem Relation Age of Onset    Hypertension Father      Social History     Tobacco Use    Smoking status: Former Smoker     Last attempt to quit: 2004     Years since quittin.3    Smokeless tobacco: Never Used   Substance Use Topics    Alcohol use: Yes     Comment: social         Review of Systems   Constitutional: Negative for chills and fever. HENT: Negative for hearing loss and tinnitus. Eyes: Negative for blurred vision and double vision. Respiratory: Negative for cough and shortness of breath. Cardiovascular: Negative for chest pain and palpitations. Gastrointestinal: Negative for nausea and vomiting. Genitourinary: Negative for dysuria and frequency. Musculoskeletal: Negative for back pain and falls. Skin: Negative for itching and rash. Neurological: Positive for dizziness (slight). Negative for loss of consciousness and headaches. Endo/Heme/Allergies: Negative.     Psychiatric/Behavioral: Negative for depression. The patient is not nervous/anxious. PHYSICAL EXAMINATION:  Vital Signs: (As obtained by patient/caregiver at home)  Visit Vitals  /80        Constitutional: [x] Appears well-developed and well-nourished [x] No apparent distress      [] Abnormal -     Mental status: [x] Alert and awake  [x] Oriented to person/place/time [x] Able to follow commands    [] Abnormal -     Eyes:   EOM    [x]  Normal    [] Abnormal -   Sclera  [x]  Normal    [] Abnormal -          Discharge [x]  None visible   [] Abnormal -     HENT: [x] Normocephalic, atraumatic  [] Abnormal -   [x] Mouth/Throat: Mucous membranes are moist    External Ears [x] Normal  [] Abnormal -    Neck: [x] No visualized mass [] Abnormal -     Pulmonary/Chest: [x] Respiratory effort normal   [x] No visualized signs of difficulty breathing or respiratory distress        [] Abnormal -      Musculoskeletal:   [x] Normal gait with no signs of ataxia         [x] Normal range of motion of neck        [] Abnormal -     Neurological:        [x] No Facial Asymmetry (Cranial nerve 7 motor function) (limited exam due to video visit)          [x] No gaze palsy        [] Abnormal -          Skin:        [x] No significant exanthematous lesions or discoloration noted on facial skin         [] Abnormal -            Psychiatric:       [x] Normal Affect [] Abnormal -        [x] No Hallucinations    Other pertinent observable physical exam findings:-    Assessment & Plan:   Diagnoses and all orders for this visit:    1. Transient elevated blood pressure  Pt states that bp readings have been around 130/135 this current week, with occasional high peaks in 140-150 range in the past month. This morning, he reported that he used his wrist monitor and had bp reading of 145/179. On the Omron device he uses, pt reports that bp was 130/185. Pt does not take bp medication at this time. 2. Obesity (BMI 30-39. 9)  Pt reports that he lost two pounds since last visit.  I have reviewed/discussed the above normal BMI with the patient. I have recommended the following interventions: dietary management education, guidance, and counseling, encourage exercise and monitor weight. We discussed the expected course, resolution and complications of the diagnosis(es) in detail. Medication risks, benefits, costs, interactions, and alternatives were discussed as indicated. I advised her to contact the office if her condition worsens, changes or fails to improve as anticipated. She expressed understanding with the diagnosis(es) and plan. Pursuant to the emergency declaration under the [employer] and [facility], 1135 waiver authority and the [employer] and [employer] Act, this Virtual Visit was conducted, with patient's consent, to reduce the patient's risk of exposure to COVID-19 and provide continuity of care for an established patient. Services were provided through a video synchronous discussion virtually to substitute for in-person clinic visit. Written by sulma Singh, as dictated by Steve Huizar M.D.    10:25 AM - 10:34 AM    Total time spent with the patient 9 minutes, greater than 50% of time spent counseling patient.

## 2020-04-23 ENCOUNTER — TELEPHONE (OUTPATIENT)
Dept: FAMILY MEDICINE CLINIC | Age: 40
End: 2020-04-23

## 2020-04-23 NOTE — TELEPHONE ENCOUNTER
----- Message from Steve Huizar MD sent at 4/22/2020 10:32 AM EDT -----  Regarding: blood pressure  Pt needs a 1 month BP follow up please

## 2020-04-23 NOTE — TELEPHONE ENCOUNTER
[phone] (home) [phone] (work)  Spoke to patient and set up 1 month follow up patient has telemed appointment Wednesday, May 20, 2020 12:30 PM and understand

## 2020-05-21 ENCOUNTER — TELEPHONE (OUTPATIENT)
Dept: FAMILY MEDICINE CLINIC | Age: 40
End: 2020-05-21

## 2020-05-21 NOTE — TELEPHONE ENCOUNTER
Pt has been having very high blood pressure reading recently and is very concerned. Did not want a vv as he feels that he needs to be seen in person. Wants a nurse to call/advice.     BCB: [phone]

## 2020-05-22 ENCOUNTER — VIRTUAL VISIT (OUTPATIENT)
Dept: FAMILY MEDICINE CLINIC | Age: 40
End: 2020-05-22

## 2020-05-22 DIAGNOSIS — I10 ESSENTIAL HYPERTENSION: Primary | ICD-10-CM

## 2020-05-22 DIAGNOSIS — E66.9 OBESITY (BMI 30-39.9): ICD-10-CM

## 2020-05-22 NOTE — PROGRESS NOTES
Aristides Atwood is a 44 y.o. male who was seen by synchronous (real-time) audio-video technology on 5/22/2020. Consent: Aristides Atwood, who was seen by synchronous (real-time) audio-video technology, and/or his healthcare decision maker, is aware that this patient-initiated, Telehealth encounter on 5/22/2020 is a billable service, with coverage as determined by his insurance carrier. He is aware that he may receive a bill and has provided verbal consent to proceed: Yes. Assessment & Plan:   Diagnoses and all orders for this visit:    1. Essential hypertension  Continue home monitoring and call for reading >140/90. Reviewed low sodium diet and weight loss. 2. Obesity (BMI 30-39. 9)  Discussed need for weight loss through diet and exercise. Reviewed decreased caloric intake and increased activity. I spent at least 23 minutes on this visit with this established patient. ([phone]  Subjective:   Aristides Atwood is a 44 y.o. male who was seen for Hypertension    Cardiovascular Review:  The patient has hyperlipidemia and obesity. Diet and Lifestyle: generally follows a low fat low cholesterol diet, generally follows a low sodium diet, exercises regularly, nonsmoker. Patient recently hired a  and is going to the gym routinely. Home BP Monitoring: is measured at home: 120/80 - 140/90s  Pertinent ROS: no TIA's, no chest pain on exertion, no dyspnea on exertion, no swelling of ankles. Patient seen by pulmonology for asthma. Receiving monthly injections of Fasenra. Currently taking Symbicort. Patient is allergic to tree, grass and cat. Prior to Admission medications    Medication Sig Start Date End Date Taking? Authorizing Provider   benralizumab (FASENRA) 30 mg/mL syrg 30 mg by SubCUTAneous route every thirty (30) days.    Yes Provider, Historical   montelukast (SINGULAIR) 10 mg tablet  2/27/20   Provider, Historical   SYMBICORT 80-4.5 mcg/actuation HFAA TAKE 2 PUFFS BY MOUTH TWICE A DAY 9/25/19   Provider, Historical   sildenafil, antihypertensive, (REVATIO) 20 mg tablet TAKE 2-3 TABS BY MOUTH DAILY AS NEEDED (ED). 9/10/19   Mare Mancuso NP   fluticasone (FLONASE) 50 mcg/actuation nasal spray 2 Sprays by Both Nostrils route daily. 1/10/17   Mare Mancuso NP   albuterol (PROVENTIL HFA, VENTOLIN HFA, PROAIR HFA) 90 mcg/actuation inhaler Take 1-2 Puffs by inhalation every four (4) hours as needed for Wheezing. 1/10/17   Mare Mancuso NP   multivitamin (ONE A DAY) tablet Take 1 Tab by mouth daily. Provider, Historical     No Known Allergies    Past Medical History:   Diagnosis Date    Hepatic steatosis 2014    confirmed by US    Moderate persistent asthma without complication 58/57/1151    Obesity      Past Surgical History:   Procedure Laterality Date    HX TONSILLECTOMY         ROS  See HPI    Objective: There were no vitals taken for this visit. General: alert, cooperative, no distress   Mental  status: normal mood, behavior, speech, dress, motor activity, and thought processes, able to follow commands   HENT: NCAT   Neck: no visualized mass   Resp: no respiratory distress   Neuro: no gross deficits   Skin: no discoloration or lesions of concern on visible areas   Psychiatric: normal affect, consistent with stated mood, no evidence of hallucinations       We discussed the expected course, resolution and complications of the diagnosis(es) in detail. Medication risks, benefits, costs, interactions, and alternatives were discussed as indicated. I advised him to contact the office if his condition worsens, changes or fails to improve as anticipated. He expressed understanding with the diagnosis(es) and plan. Macho Smith is a 44 y.o. male who was evaluated by a video visit encounter for concerns as above. Patient identification was verified prior to start of the visit. A caregiver was present when appropriate.  Due to this being a TeleHealth encounter (During COVID-19 public health emergency), evaluation of the following organ systems was limited: Vitals/Constitutional/EENT/Resp/CV/GI//MS/Neuro/Skin/Heme-Lymph-Imm. Pursuant to the emergency declaration under the [address] [place] [address], [facility] waiver authority and the [employer] and [employer] Act, this Virtual  Visit was conducted, with patient's (and/or legal guardian's) consent, to reduce the patient's risk of exposure to COVID-19 and provide necessary medical care. Services were provided through a video synchronous discussion virtually to substitute for in-person clinic visit. Patient and provider were located at their individual homes.       Peng Bernal NP

## 2020-06-01 ENCOUNTER — PATIENT MESSAGE (OUTPATIENT)
Dept: FAMILY MEDICINE CLINIC | Age: 40
End: 2020-06-01

## 2020-06-01 DIAGNOSIS — I10 ESSENTIAL HYPERTENSION: Primary | ICD-10-CM

## 2020-06-02 ENCOUNTER — PATIENT MESSAGE (OUTPATIENT)
Dept: FAMILY MEDICINE CLINIC | Age: 40
End: 2020-06-02

## 2020-06-02 DIAGNOSIS — I10 ESSENTIAL HYPERTENSION: Primary | ICD-10-CM

## 2020-06-02 RX ORDER — VALSARTAN 80 MG/1
80 TABLET ORAL DAILY
Qty: 30 TAB | Refills: 5 | Status: SHIPPED | OUTPATIENT
Start: 2020-06-02 | End: 2020-06-11 | Stop reason: DRUGHIGH

## 2020-06-02 NOTE — TELEPHONE ENCOUNTER
From: Aristides Atwood  To: Jonathan Dao NP  Sent: 6/1/2020 9:44 AM EDT  Subject: Visit Follow-Up Question    Hello,    As we discussed I have been measuring my blood pressure regularly In the last 7 days all of my Systolic readings have been over 455 and Diastolic over 95 using the Omron Upper Arm monitor. My reading this morning was 150/105 and last night was 150/96. The wrost monitor has been significantly lower most average around 135/85. Let me know if I should start the blood pressure medication temporarily and also come in for a visit to confirm my readings as we had discussed.  Thanks    [employer]

## 2020-06-11 RX ORDER — VALSARTAN 160 MG/1
160 TABLET ORAL DAILY
Qty: 90 TAB | Refills: 1 | Status: SHIPPED | OUTPATIENT
Start: 2020-06-11 | End: 2020-06-16 | Stop reason: SDUPTHER

## 2020-06-11 NOTE — TELEPHONE ENCOUNTER
From: Patrice Valdovinos  To: Sj Tatum NP  Sent: 6/2/2020 12:34 PM EDT  Subject: Visit Follow-Up Question    Hello,    I sent a message yesterday with my blood pressure readings. Attached are the images of my readings from three different monitors. Ideally the wrist monitors should be having higher readings but I am seeing lower on them and the Omron Upper Arm had a really high reading of 162/104. Please let me know what to do or should I be getting on medication now?     Mihai

## 2020-06-16 DIAGNOSIS — I10 ESSENTIAL HYPERTENSION: ICD-10-CM

## 2020-06-16 RX ORDER — VALSARTAN 160 MG/1
160 TABLET ORAL DAILY
Qty: 90 TAB | Refills: 1 | Status: SHIPPED | OUTPATIENT
Start: 2020-06-16 | End: 2021-02-11 | Stop reason: SDUPTHER

## 2020-06-16 NOTE — TELEPHONE ENCOUNTER
NP Sandy Whitaker,    Patient request pharmacy change to [id number] Sent new rx on 6/11/20 to [facility].  ThanksImelda    Previous Refill Encounter(s): 6/11/20  90+ 1 refill    Requested Prescriptions     Pending Prescriptions Disp Refills    valsartan (DIOVAN) 160 mg tablet 90 Tab 1     Sig: Take 1 Tab by mouth daily.  For blood pressure

## 2020-10-12 DIAGNOSIS — N52.9 ERECTILE DYSFUNCTION, UNSPECIFIED ERECTILE DYSFUNCTION TYPE: ICD-10-CM

## 2020-10-12 RX ORDER — SILDENAFIL CITRATE 20 MG/1
40-60 TABLET ORAL
Qty: 30 TAB | Refills: 3 | Status: SHIPPED | OUTPATIENT
Start: 2020-10-12 | End: 2021-02-11 | Stop reason: ALTCHOICE

## 2021-02-11 ENCOUNTER — OFFICE VISIT (OUTPATIENT)
Dept: FAMILY MEDICINE CLINIC | Age: 41
End: 2021-02-11
Payer: COMMERCIAL

## 2021-02-11 VITALS
HEART RATE: 75 BPM | OXYGEN SATURATION: 98 % | BODY MASS INDEX: 36.03 KG/M2 | HEIGHT: 71 IN | RESPIRATION RATE: 16 BRPM | TEMPERATURE: 97.2 F | DIASTOLIC BLOOD PRESSURE: 95 MMHG | WEIGHT: 257.4 LBS | SYSTOLIC BLOOD PRESSURE: 139 MMHG

## 2021-02-11 DIAGNOSIS — Z00.00 ROUTINE GENERAL MEDICAL EXAMINATION AT A HEALTH CARE FACILITY: Primary | ICD-10-CM

## 2021-02-11 DIAGNOSIS — I10 ESSENTIAL HYPERTENSION: ICD-10-CM

## 2021-02-11 DIAGNOSIS — E66.01 SEVERE OBESITY (HCC): ICD-10-CM

## 2021-02-11 LAB
ALBUMIN SERPL-MCNC: 3.9 G/DL (ref 3.5–5)
ALBUMIN/GLOB SERPL: 1.1 {RATIO} (ref 1.1–2.2)
ALP SERPL-CCNC: 68 U/L (ref 45–117)
ALT SERPL-CCNC: 39 U/L (ref 12–78)
ANION GAP SERPL CALC-SCNC: 8 MMOL/L (ref 5–15)
AST SERPL-CCNC: 14 U/L (ref 15–37)
BASOPHILS # BLD: 0 K/UL (ref 0–0.1)
BASOPHILS NFR BLD: 0 % (ref 0–1)
BILIRUB SERPL-MCNC: 0.5 MG/DL (ref 0.2–1)
BUN SERPL-MCNC: 16 MG/DL (ref 6–20)
BUN/CREAT SERPL: 19 (ref 12–20)
CALCIUM SERPL-MCNC: 9.4 MG/DL (ref 8.5–10.1)
CHLORIDE SERPL-SCNC: 107 MMOL/L (ref 97–108)
CHOLEST SERPL-MCNC: 204 MG/DL
CO2 SERPL-SCNC: 26 MMOL/L (ref 21–32)
CREAT SERPL-MCNC: 0.86 MG/DL (ref 0.7–1.3)
DIFFERENTIAL METHOD BLD: NORMAL
EOSINOPHIL # BLD: 0 K/UL (ref 0–0.4)
EOSINOPHIL NFR BLD: 0 % (ref 0–7)
ERYTHROCYTE [DISTWIDTH] IN BLOOD BY AUTOMATED COUNT: 12.5 % (ref 11.5–14.5)
GLOBULIN SER CALC-MCNC: 3.5 G/DL (ref 2–4)
GLUCOSE SERPL-MCNC: 100 MG/DL (ref 65–100)
HCT VFR BLD AUTO: 44.3 % (ref 36.6–50.3)
HDLC SERPL-MCNC: 49 MG/DL
HDLC SERPL: 4.2 {RATIO} (ref 0–5)
HGB BLD-MCNC: 14.6 G/DL (ref 12.1–17)
IMM GRANULOCYTES # BLD AUTO: 0 K/UL (ref 0–0.04)
IMM GRANULOCYTES NFR BLD AUTO: 0 % (ref 0–0.5)
LDLC SERPL CALC-MCNC: 128.8 MG/DL (ref 0–100)
LIPID PROFILE,FLP: ABNORMAL
LYMPHOCYTES # BLD: 1.9 K/UL (ref 0.8–3.5)
LYMPHOCYTES NFR BLD: 29 % (ref 12–49)
MCH RBC QN AUTO: 29.6 PG (ref 26–34)
MCHC RBC AUTO-ENTMCNC: 33 G/DL (ref 30–36.5)
MCV RBC AUTO: 89.7 FL (ref 80–99)
MONOCYTES # BLD: 0.4 K/UL (ref 0–1)
MONOCYTES NFR BLD: 7 % (ref 5–13)
NEUTS SEG # BLD: 4.2 K/UL (ref 1.8–8)
NEUTS SEG NFR BLD: 64 % (ref 32–75)
NRBC # BLD: 0 K/UL (ref 0–0.01)
NRBC BLD-RTO: 0 PER 100 WBC
PLATELET # BLD AUTO: 320 K/UL (ref 150–400)
PMV BLD AUTO: 10.2 FL (ref 8.9–12.9)
POTASSIUM SERPL-SCNC: 4.8 MMOL/L (ref 3.5–5.1)
PROT SERPL-MCNC: 7.4 G/DL (ref 6.4–8.2)
RBC # BLD AUTO: 4.94 M/UL (ref 4.1–5.7)
SODIUM SERPL-SCNC: 141 MMOL/L (ref 136–145)
TRIGL SERPL-MCNC: 131 MG/DL (ref ?–150)
VLDLC SERPL CALC-MCNC: 26.2 MG/DL
WBC # BLD AUTO: 6.6 K/UL (ref 4.1–11.1)

## 2021-02-11 PROCEDURE — 99396 PREV VISIT EST AGE 40-64: CPT | Performed by: NURSE PRACTITIONER

## 2021-02-11 RX ORDER — VALSARTAN 160 MG/1
160 TABLET ORAL DAILY
Qty: 90 TAB | Refills: 3 | Status: SHIPPED | OUTPATIENT
Start: 2021-02-11 | End: 2022-04-27 | Stop reason: SDUPTHER

## 2021-02-11 RX ORDER — SILDENAFIL 100 MG/1
100 TABLET, FILM COATED ORAL AS NEEDED
Qty: 30 TAB | Refills: 5 | Status: SHIPPED | OUTPATIENT
Start: 2021-02-11 | End: 2022-04-27 | Stop reason: SDUPTHER

## 2021-02-11 NOTE — PATIENT INSTRUCTIONS
Well Visit, Ages 25 to 48: Care Instructions Your Care Instructions Physical exams can help you stay healthy. Your doctor has checked your overall health and may have suggested ways to take good care of yourself. He or she also may have recommended tests. At home, you can help prevent illness with healthy eating, regular exercise, and other steps. Follow-up care is a key part of your treatment and safety. Be sure to make and go to all appointments, and call your doctor if you are having problems. It's also a good idea to know your test results and keep a list of the medicines you take. How can you care for yourself at home? · Reach and stay at a healthy weight. This will lower your risk for many problems, such as obesity, diabetes, heart disease, and high blood pressure. · Get at least 30 minutes of physical activity on most days of the week. Walking is a good choice. You also may want to do other activities, such as running, swimming, cycling, or playing tennis or team sports. Discuss any changes in your exercise program with your doctor. · Do not smoke or allow others to smoke around you. If you need help quitting, talk to your doctor about stop-smoking programs and medicines. These can increase your chances of quitting for good. · Talk to your doctor about whether you have any risk factors for sexually transmitted infections (STIs). Having one sex partner (who does not have STIs and does not have sex with anyone else) is a good way to avoid these infections. · Use birth control if you do not want to have children at this time. Talk with your doctor about the choices available and what might be best for you. · Protect your skin from too much sun. When you're outdoors from 10 a.m. to 4 p.m., stay in the shade or cover up with clothing and a hat with a wide brim. Wear sunglasses that block UV rays. Even when it's cloudy, put broad-spectrum sunscreen (SPF 30 or higher) on any exposed skin. · See a dentist one or two times a year for checkups and to have your teeth cleaned. · Wear a seat belt in the car. Follow your doctor's advice about when to have certain tests. These tests can spot problems early. For everyone · Cholesterol. Have the fat (cholesterol) in your blood tested after age 21. Your doctor will tell you how often to have this done based on your age, family history, or other things that can increase your risk for heart disease. · Blood pressure. Have your blood pressure checked during a routine doctor visit. Your doctor will tell you how often to check your blood pressure based on your age, your blood pressure results, and other factors. · Vision. Talk with your doctor about how often to have a glaucoma test. 
· Diabetes. Ask your doctor whether you should have tests for diabetes. · Colon cancer. Your risk for colorectal cancer gets higher as you get older. Some experts say that adults should start regular screening at age 48 and stop at age 76. Others say to start before age 48 or continue after age 76. Talk with your doctor about your risk and when to start and stop screening. For women · Breast exam and mammogram. Talk to your doctor about when you should have a clinical breast exam and a mammogram. Medical experts differ on whether and how often women under 50 should have these tests. Your doctor can help you decide what is right for you. · Cervical cancer screening test and pelvic exam. Begin with a Pap test at age 24. The test often is part of a pelvic exam. Starting at age 27, you may choose to have a Pap test, an HPV test, or both tests at the same time (called co-testing). Talk with your doctor about how often to have testing. · Tests for sexually transmitted infections (STIs). Ask whether you should have tests for STIs. You may be at risk if you have sex with more than one person, especially if your partners do not wear condoms. For men · Tests for sexually transmitted infections (STIs). Ask whether you should have tests for STIs. You may be at risk if you have sex with more than one person, especially if you do not wear a condom. · Testicular cancer exam. Ask your doctor whether you should check your testicles regularly. · Prostate exam. Talk to your doctor about whether you should have a blood test (called a PSA test) for prostate cancer. Experts differ on whether and when men should have this test. Some experts suggest it if you are older than 39 and are -American or have a father or brother who got prostate cancer when he was younger than 72. When should you call for help? Watch closely for changes in your health, and be sure to contact your doctor if you have any problems or symptoms that concern you. Where can you learn more? Go to http://www.gordon.com/ Enter P072 in the search box to learn more about \"Well Visit, Ages 25 to 48: Care Instructions. \" Current as of: May 27, 2020               Content Version: 12.6 © 2006-2020 [employer]. Care instructions adapted under license by [employer] website (which disclaims liability or warranty for this information). If you have questions about a medical condition or this instruction, always ask your healthcare professional. [address] any warranty or liability for your use of this information.

## 2021-02-11 NOTE — PROGRESS NOTES
[facility] Note    Renata Long is a 36 y.o. male who was seen in clinic today (2/11/2021). Subjective:  Cardiovascular Review:  The patient has hypertension, hyperlipidemia and obesity. Diet and Lifestyle: generally follows a low fat low cholesterol diet, generally follows a low sodium diet, exercises regularly, nonsmoker. Patient recently hired a  and is going to the gym routinely. Home BP Monitoring: is measured at home: 130/90s  Pertinent ROS: no TIA's, no chest pain on exertion, no dyspnea on exertion, no swelling of ankles. Patient seen by pulmonology for asthma. Currently taking Symbicort. Patient is allergic to tree, grass and cat. Patient is using TilAltheaDx Severance every 2 months. Prior to Admission medications    Medication Sig Start Date End Date Taking? Authorizing Provider   valsartan (DIOVAN) 160 mg tablet Take 1 Tab by mouth daily. For blood pressure 2/11/21  Yes Kane Regan NP   sildenafil citrate (VIAGRA) 100 mg tablet Take 1 Tab by mouth as needed for Erectile Dysfunction. 2/11/21  Yes Kane Regan NP   benralizumab (FASENRA) 30 mg/mL syrg 30 mg by SubCUTAneous route every thirty (30) days. Yes Provider, Historical   montelukast (SINGULAIR) 10 mg tablet  2/27/20  Yes Provider, Historical   fluticasone (FLONASE) 50 mcg/actuation nasal spray 2 Sprays by Both Nostrils route daily. 1/10/17  Yes Kane Regan NP   albuterol (PROVENTIL HFA, VENTOLIN HFA, PROAIR HFA) 90 mcg/actuation inhaler Take 1-2 Puffs by inhalation every four (4) hours as needed for Wheezing. 1/10/17  Yes Kane Regan NP   multivitamin (ONE A DAY) tablet Take 1 Tab by mouth daily. Yes Provider, Historical   sildenafiL, pulmonary hypertension, (REVATIO) 20 mg tablet TAKE 2-3 TABS BY MOUTH DAILY AS NEEDED (ED). 10/12/20 2/11/21  Rudolph Mccormack NP   valsartan (DIOVAN) 160 mg tablet Take 1 Tab by mouth daily.  For blood pressure 6/16/20 2/11/21  Wilton Sheikh, NP   SYMBICORT 80-4.5 mcg/actuation HFAA TAKE 2 PUFFS BY MOUTH TWICE A DAY 9/25/19   Provider, Historical          No Known Allergies        Review of Systems   Constitutional: Negative for malaise/fatigue and weight loss. Respiratory: Negative for shortness of breath. Cardiovascular: Negative for chest pain, palpitations and leg swelling. Gastrointestinal: Negative for heartburn. Musculoskeletal: Negative for back pain, joint pain and myalgias. Neurological: Negative for dizziness, weakness and headaches. Psychiatric/Behavioral: Negative for depression. Objective:   Physical Exam  Vitals signs and nursing note reviewed. Constitutional:       Appearance: He is well-developed. Neck:      Musculoskeletal: Normal range of motion and neck supple. Thyroid: No thyromegaly. Vascular: No carotid bruit or JVD. Cardiovascular:      Rate and Rhythm: Normal rate and regular rhythm. Heart sounds: No murmur. No friction rub. No gallop. Pulmonary:      Effort: Pulmonary effort is normal. No respiratory distress. Breath sounds: Normal breath sounds. Lymphadenopathy:      Cervical: No cervical adenopathy. Neurological:      Mental Status: He is alert and oriented to person, place, and time. Psychiatric:         Behavior: Behavior normal.         Visit Vitals  BP (!) 139/95 (BP 1 Location: Left upper arm, BP Patient Position: Sitting)   Pulse 75   Temp 97.2 °F (36.2 °C) (Temporal)   Resp 16   Ht 5' 11\" (1.803 m)   Wt 257 lb 6.4 oz (116.8 kg)   SpO2 98%   BMI 35.90 kg/m²       Assessment & Plan:  Diagnoses and all orders for this visit:    1. Routine general medical examination at a health care facility  Well adult, reviewed routine screenings as well as healthy diet and lifestyle practices  -     METABOLIC PANEL, COMPREHENSIVE; Future  -     LIPID PANEL; Future  -     CBC WITH AUTOMATED DIFF; Future    2.  Severe obesity ([address]  Discussed need for weight loss through diet and exercise. Reviewed decreased caloric intake and increased activity. 3. Essential hypertension  Well controlled, no medication changes. -     valsartan (DIOVAN) 160 mg tablet; Take 1 Tab by mouth daily. For blood pressure    Other orders  -     sildenafil citrate (VIAGRA) 100 mg tablet; Take 1 Tab by mouth as needed for Erectile Dysfunction. I have discussed the diagnosis with the patient and the intended plan as seen in the above orders. The patient has received an after-visit summary along with patient information handout. I have discussed medication side effects and warnings with the patient as well. Follow-up and Dispositions    · Return in about 6 months (around 8/11/2021) for disease management.            Vianey Rivera NP

## 2021-02-11 NOTE — PROGRESS NOTES
Chief Complaint   Patient presents with    Complete Physical     1. Have you been to the ER, urgent care clinic since your last visit? Hospitalized since your last visit? No    2. Have you seen or consulted any other health care providers outside of the [address] [place] [address] since your last visit? Include any pap smears or colon screening.  No

## 2022-03-18 PROBLEM — E66.9 OBESITY (BMI 30-39.9): Status: ACTIVE | Noted: 2019-11-15

## 2022-03-19 PROBLEM — J45.40 MODERATE PERSISTENT ASTHMA WITHOUT COMPLICATION: Status: ACTIVE | Noted: 2019-11-15

## 2022-03-19 PROBLEM — E66.01 SEVERE OBESITY (HCC): Status: ACTIVE | Noted: 2020-03-25

## 2022-04-22 DIAGNOSIS — I10 ESSENTIAL HYPERTENSION: ICD-10-CM

## 2022-04-22 RX ORDER — VALSARTAN 160 MG/1
160 TABLET ORAL DAILY
Qty: 90 TABLET | Refills: 3 | OUTPATIENT
Start: 2022-04-22

## 2022-04-22 RX ORDER — VALSARTAN 160 MG/1
160 TABLET ORAL DAILY
Qty: 30 TABLET | Refills: 0 | Status: CANCELLED | OUTPATIENT
Start: 2022-04-22

## 2022-04-22 NOTE — TELEPHONE ENCOUNTER
No  is not taking New Patient's I would look at [facility]   No visit in over a year.   Refilled denied   No future appointment scheduled at this time

## 2022-04-22 NOTE — TELEPHONE ENCOUNTER
Last Visit: 2/11/21 NP Jass  Next Appointment: None- Needs new provider/appt/labs  Previous Refill Encounter(s): 2/11/21 90 + 3    Requested Prescriptions     Pending Prescriptions Disp Refills    valsartan (DIOVAN) 160 mg tablet 30 Tablet 0     Sig: Take 1 Tablet by mouth daily.  For blood pressure

## 2022-04-26 DIAGNOSIS — I10 ESSENTIAL HYPERTENSION: ICD-10-CM

## 2022-04-26 NOTE — TELEPHONE ENCOUNTER
JUAN ALBERTO Manzano,    Request from [email or web address] for new rx valsartan. Patient has appt tomorrow. (Hold until after appt). Thanks, Imelda    Last Visit: 2/11/21 JUAN ALBERTO Regan  Next Appointment: 4/27/22 JUAN ALBERTO Tohmas  Previous Refill Encounter(s): 2/11/21 90 + 3    Requested Prescriptions     Pending Prescriptions Disp Refills    valsartan (DIOVAN) 160 mg tablet 90 Tablet 3     Sig: Take 1 Tablet by mouth daily.  For blood pressure

## 2022-04-27 ENCOUNTER — OFFICE VISIT (OUTPATIENT)
Dept: FAMILY MEDICINE CLINIC | Age: 42
End: 2022-04-27
Payer: COMMERCIAL

## 2022-04-27 VITALS
HEART RATE: 74 BPM | SYSTOLIC BLOOD PRESSURE: 123 MMHG | WEIGHT: 273.6 LBS | DIASTOLIC BLOOD PRESSURE: 86 MMHG | OXYGEN SATURATION: 98 % | TEMPERATURE: 98 F | BODY MASS INDEX: 38.3 KG/M2 | RESPIRATION RATE: 16 BRPM | HEIGHT: 71 IN

## 2022-04-27 DIAGNOSIS — E55.9 VITAMIN D DEFICIENCY: ICD-10-CM

## 2022-04-27 DIAGNOSIS — Z23 ENCOUNTER FOR IMMUNIZATION: ICD-10-CM

## 2022-04-27 DIAGNOSIS — Z00.00 WELLNESS EXAMINATION: Primary | ICD-10-CM

## 2022-04-27 DIAGNOSIS — E66.01 SEVERE OBESITY (BMI 35.0-39.9) WITH COMORBIDITY (HCC): ICD-10-CM

## 2022-04-27 DIAGNOSIS — I10 ESSENTIAL HYPERTENSION: ICD-10-CM

## 2022-04-27 PROCEDURE — 90471 IMMUNIZATION ADMIN: CPT | Performed by: NURSE PRACTITIONER

## 2022-04-27 PROCEDURE — 99396 PREV VISIT EST AGE 40-64: CPT | Performed by: NURSE PRACTITIONER

## 2022-04-27 PROCEDURE — 90715 TDAP VACCINE 7 YRS/> IM: CPT | Performed by: NURSE PRACTITIONER

## 2022-04-27 RX ORDER — VALSARTAN 160 MG/1
160 TABLET ORAL DAILY
Qty: 90 TABLET | Refills: 3 | Status: SHIPPED | OUTPATIENT
Start: 2022-04-27

## 2022-04-27 RX ORDER — SILDENAFIL 100 MG/1
100 TABLET, FILM COATED ORAL AS NEEDED
Qty: 30 TABLET | Refills: 5 | Status: SHIPPED | OUTPATIENT
Start: 2022-04-27

## 2022-04-27 NOTE — PATIENT INSTRUCTIONS
DTaP (Diphtheria, Tetanus, Pertussis) Vaccine: What You Need to Know  Why get vaccinated? DTaP vaccine can prevent diphtheria, tetanus, and pertussis. Diphtheria and pertussis spread from person to person. Tetanus enters the body through cuts or wounds. · DIPHTHERIA (D) can lead to difficulty breathing, heart failure, paralysis, or death. · TETANUS (T) causes painful stiffening of the muscles. Tetanus can lead to serious health problems, including being unable to open the mouth, having trouble swallowing and breathing, or death. · PERTUSSIS (aP), also known as \"whooping cough,\" can cause uncontrollable, violent coughing that makes it hard to breathe, eat, or drink. Pertussis can be extremely serious especially in babies and young children, causing pneumonia, convulsions, brain damage, or death. In teens and adults, it can cause weight loss, loss of bladder control, passing out, and rib fractures from severe coughing. DTaP vaccine  DTaP is only for children younger than 9years old. Different vaccines against tetanus, diphtheria, and pertussis (Tdap and Td) are available for older children, adolescents, and adults. It is recommended that children receive 5 doses of DTaP, usually at the following ages:  · 2 months  · 4 months  · 6 months  · 15-18 months  · 4-6 years  DTaP may be given as a stand-alone vaccine, or as part of a combination vaccine (a type of vaccine that combines more than one vaccine together into one shot). DTaP may be given at the same time as other vaccines.   Talk with your health care provider  Tell your vaccination provider if the person getting the vaccine:  · Has had an allergic reaction after a previous dose of any vaccine that protects against tetanus, diphtheria, or pertussis, or has any severe, life-threatening allergies  · Has had a coma, decreased level of consciousness, or prolonged seizures within 7 days after a previous dose of any pertussis vaccine (DTP or DTaP)  · Has seizures or another nervous system problem  · Has ever had Guillain-Barré Syndrome (also called \"GBS\")  · Has had severe pain or swelling after a previous dose of any vaccine that protects against tetanus or diphtheria  In some cases, your child's health care provider may decide to postpone DTaP vaccination until a future visit. Children with minor illnesses, such as a cold, may be vaccinated. Children who are moderately or severely ill should usually wait until they recover before getting DTaP vaccine. Your child's health care provider can give you more information. Risks of a vaccine reaction  · Soreness or swelling where the shot was given, fever, fussiness, feeling tired, loss of appetite, and vomiting sometimes happen after DTaP vaccination. · More serious reactions, such as seizures, non-stop crying for 3 hours or more, or high fever (over 105°F) after DTaP vaccination happen much less often. Rarely, vaccination is followed by swelling of the entire arm or leg, especially in older children when they receive their fourth or fifth dose. As with any medicine, there is a very remote chance of a vaccine causing a severe allergic reaction, other serious injury, or death. What if there is a serious problem? An allergic reaction could occur after the vaccinated person leaves the clinic. If you see signs of a severe allergic reaction (hives, swelling of the face and throat, difficulty breathing, a fast heartbeat, dizziness, or weakness), call 9-1-1 and get the person to the nearest hospital.  For other signs that concern you, call your health care provider. Adverse reactions should be reported to the Vaccine Adverse Event Reporting System (VAERS). Your health care provider will usually file this report, or you can do it yourself. Visit the VAERS website at www.vaers. hhs.gov or call 8-634.456.5027. VAERS is only for reporting reactions, and VAERS staff members do not give medical advice.   The [employer] Injury Compensation Program  The National Vaccine Injury Compensation Program (VICP) is a federal program that was created to compensate people who may have been injured by certain vaccines. Claims regarding alleged injury or death due to vaccination have a time limit for filing, which may be as short as two years. Visit the VICP website at www.[facility].gov/vaccinecompensation or call 0-173.173.9976 to learn about the program and about filing a claim. How can I learn more? · Ask your health care provider. · Call your local or state health department. · Visit the website of the Food and Drug Administration (FDA) for vaccine package inserts and additional information at www.fda.gov/vaccines-blood-biologics/vaccines. · Contact the Centers for Disease Control and Prevention (CDC):  ? Call 4-597.257.9928 (1-800-CDC-INFO) or  ? Visit CDC's website at www.cdc.gov/vaccines  Vaccine Information Statement  DTaP (Diphtheria, Tetanus, Pertussis) Vaccine  8/6/2021  [id number]  [facility] for Disease Control and Prevention  Many vaccine information statements are available in Yakut and other languages. See www.immunize.org/vis  Hojas de información sobre vacunas están disponibles en español y en muchos otros idiomas. Visite www.immunize.org/vis  Care instructions adapted under license by [employer] (which disclaims liability or warranty for this information). If you have questions about a medical condition or this instruction, always ask your healthcare professional. [place] [address] any warranty or liability for your use of this information.

## 2022-04-27 NOTE — PROGRESS NOTES
Chief Complaint   Patient presents with   [facility] Establish Care    Medication Evaluation         1. \"Have you been to the ER, urgent care clinic since your last visit? Hospitalized since your last visit? \" Yes When: 4/22 Where: better med Reason for visit: insect bite    2. \"Have you seen or consulted any other health care providers outside of the [address] [place] [address] since your last visit? \" No     3. For patients over 45: Has the patient had a colonoscopy?  NA - based on age       1 most recent PHQ Screens 4/27/2022   Little interest or pleasure in doing things Not at all   Feeling down, depressed, irritable, or hopeless Not at all   Total Score PHQ 2 0       Health Maintenance Due   Topic Date Due    COVID-19 Vaccine (1) Never done    Pneumococcal 0-64 years (1 - PCV) Never done    DTaP/Tdap/Td series (2 - Td or Tdap) 10/26/2019    Depression Screen  02/11/2022

## 2022-04-27 NOTE — PROGRESS NOTES
[facility] Note     Jt Garcia (: 1980) is a 39 y.o. male, established patient, here for evaluation of the following chief complaint(s):  Establish Care and Medication Evaluation       ASSESSMENT/PLAN:  1. Wellness examination  - Normal exam      - METABOLIC PANEL, COMPREHENSIVE; Future  -     LIPID PANEL; Future  -     HEMOGLOBIN A1C WITH EAG; Future    2. Essential hypertension  -     valsartan (DIOVAN) 160 mg tablet; Take 1 Tablet by mouth daily. For blood pressure, Normal, Disp-90 Tablet, R-3  -     METABOLIC PANEL, COMPREHENSIVE; Future  -     LIPID PANEL; Future  - Continue home BP monitoring    3. Severe obesity (BMI 35.0-39. 9) with comorbidity ([address]  -     LIPID PANEL; Future  - Started Optivia program  - Diet and lifestyle modification encouraged for weight loss and chronic disease prevention/ management    4. Vitamin D deficiency  - Reassess       - VITAMIN D, 25 HYDROXY; Future      5. Encounter for immunization  -     TETANUS, DIPHTHERIA TOXOIDS AND ACELLULAR PERTUSSIS VACCINE (TDAP), IN INDIVIDS. >=7, IM      Return in about 1 year (around 2023). SUBJECTIVE/OBJECTIVE:    Jt Garcia is a 39 y.o. male seen today for wellness exam    Cardiovascular Review:  He has hypertension and obesity. Diet and Lifestyle: generally follows a low fat low cholesterol diet, exercises regularly, nonsmoker , Optivia meal   Home BP Monitoring: is not well controlled at home, ranging 140-150's/'s. Pertinent ROS: taking medications as instructed, no medication side effects noted, no TIA's, no chest pain on exertion, no dyspnea on exertion, no swelling of ankles. Asthma:  See's Pulmonary Dr. Rochelle Degroot.  Asthma well controlled on current therapy      Occupation:   Exercise: Walks 30min daily  Social History     Socioeconomic History    Marital status:    Tobacco Use    Smoking status: Former Smoker     Quit date: 2004     Years since quittin.3  Smokeless tobacco: Never Used   Vaping Use    Vaping Use: Never used   Substance and Sexual Activity    Alcohol use: Yes     Comment: social     Drug use: No         REVIEW OF SYSTEMS:    Review of Systems   All other systems reviewed and are negative. VITAL SIGNS:    Wt Readings from Last 3 Encounters:   04/27/22 273 lb 9.6 oz (124.1 kg)   02/11/21 257 lb 6.4 oz (116.8 kg)   03/25/20 279 lb (126.6 kg)     Temp Readings from Last 3 Encounters:   04/27/22 98 °F (36.7 °C) (Temporal)   02/11/21 97.2 °F (36.2 °C) (Temporal)   03/25/20 97.5 °F (36.4 °C) (Oral)     BP Readings from Last 3 Encounters:   04/27/22 123/86   02/11/21 (!) 139/95   04/22/20 126/80     Pulse Readings from Last 3 Encounters:   04/27/22 74   02/11/21 75   03/25/20 100           PHYSICAL EXAMINATION:       General: Alert, cooperative, no distress  Eyes: Conjunctivae clear. Pupils equally round and reactive to light, Extraocular muscles intact. Ears: Normal external ear canals both ears. Nose: Nares normal. Septum midline. Mucosa normal. No drainage or sinus tenderness. Mouth/Throat: Lips, mucosa, and tongue normal. No oropharyngeal erythema. No tonsillar enlargement or exudate. Neck: Supple, symmetrical, trachea midline, no adenopathy. No thyroid enlargement/tenderness/nodules  Respiratory: Breathing comfortably, in no acute respiratory distress. Clear to auscultation bilaterally. Normal inspiratory and expiratory ratio. Cardiovascular: Regular rate and rhythm, S1, S2 normal, no murmur, click, rub or gallop. Extremities: no edema. Pulses 2+ and symmetric radial and dorsalis pedis   Abdomen: Soft, non-tender, not distended. Bowel sounds normal. No masses or organomegaly. MSK: Extremities normal appearing, atraumatic, no effusion. Gait steady and unassisted. Skin: Skin color, texture, turgor normal. No rashes or lesions on exposed skin.   Lymph nodes: Cervical, supraclavicular nodes normal.  Neurologic: Cranial nerves II-XII intact. Strength 5/5 grossly. Sensation and reflexes normal throughout. Psychiatric: Normal affect. Mood euthymic. Thoughts logical. Speech volume and speed normal              Treatment risks/benefits/costs/interactions/alternatives discussed with patient. Advised patient to call back or return to office if symptoms worsen/change/persist. If patient cannot reach us or should anything more severe/urgent arise he/she should proceed directly to the nearest emergency department. Discussed expected course/resolution/complications of diagnosis in detail with patient. Patient expressed understanding with the diagnosis and plan. An electronic signature was used to authenticate this note.   -- Óscar Peterson NP

## 2022-04-29 RX ORDER — VALSARTAN 160 MG/1
160 TABLET ORAL DAILY
Qty: 90 TABLET | Refills: 3 | OUTPATIENT
Start: 2022-04-29

## 2023-01-04 DIAGNOSIS — I10 ESSENTIAL HYPERTENSION: ICD-10-CM

## 2023-01-04 NOTE — TELEPHONE ENCOUNTER
[facility] requesting new rx for patient. Thanks, Christina Castrejon    Last Visit: 4/27/22 NP Arin Elias  Next Appointment: None  Previous Refill Encounter(s): 4/27/22 90 + 3     Requested Prescriptions     Pending Prescriptions Disp Refills    valsartan (DIOVAN) 160 mg tablet 90 Tablet 0     Sig: Take 1 Tablet by mouth daily. For blood pressure     For Pharmacy Admin Tracking Only    Program: Medication Refill  CPA in place:   Recommendation Provided To:    Intervention Detail: New Rx: 1, reason: Patient Preference  Intervention Accepted By:   Sonya Ochoa Closed?:   Time Spent (min): 5

## 2023-01-06 RX ORDER — VALSARTAN 160 MG/1
160 TABLET ORAL DAILY
Qty: 90 TABLET | Refills: 0 | Status: SHIPPED | OUTPATIENT
Start: 2023-01-06

## 2023-10-06 ENCOUNTER — TELEPHONE (OUTPATIENT)
Age: 43
End: 2023-10-06

## 2023-10-06 NOTE — TELEPHONE ENCOUNTER
----- Message from Sandra Army sent at 10/6/2023  9:51 AM EDT -----  Subject: Referral Request    Reason for referral request? Patient requesting general lab work   Provider patient wants to be referred to(if known):     Provider Phone Number(if known):     Additional Information for Provider?   ---------------------------------------------------------------------------  --------------  Ghislaine Caledonia Ade    [phone]; OK to leave message on voicemail  ---------------------------------------------------------------------------  --------------

## 2023-11-03 ENCOUNTER — OFFICE VISIT (OUTPATIENT)
Age: 43
End: 2023-11-03
Payer: COMMERCIAL

## 2023-11-03 VITALS
DIASTOLIC BLOOD PRESSURE: 84 MMHG | OXYGEN SATURATION: 99 % | WEIGHT: 278.4 LBS | HEART RATE: 75 BPM | BODY MASS INDEX: 38.98 KG/M2 | RESPIRATION RATE: 16 BRPM | SYSTOLIC BLOOD PRESSURE: 150 MMHG | TEMPERATURE: 98.3 F | HEIGHT: 71 IN

## 2023-11-03 DIAGNOSIS — I10 UNCONTROLLED HYPERTENSION: ICD-10-CM

## 2023-11-03 DIAGNOSIS — Z23 INFLUENZA VACCINE NEEDED: ICD-10-CM

## 2023-11-03 DIAGNOSIS — R73.01 IMPAIRED FASTING GLUCOSE: ICD-10-CM

## 2023-11-03 DIAGNOSIS — E66.01 CLASS 2 SEVERE OBESITY DUE TO EXCESS CALORIES WITH SERIOUS COMORBIDITY AND BODY MASS INDEX (BMI) OF 38.0 TO 38.9 IN ADULT (HCC): ICD-10-CM

## 2023-11-03 DIAGNOSIS — R53.83 OTHER FATIGUE: ICD-10-CM

## 2023-11-03 DIAGNOSIS — Z12.5 SCREENING PSA (PROSTATE SPECIFIC ANTIGEN): ICD-10-CM

## 2023-11-03 DIAGNOSIS — Z00.00 WELLNESS EXAMINATION: Primary | ICD-10-CM

## 2023-11-03 PROCEDURE — 99396 PREV VISIT EST AGE 40-64: CPT | Performed by: NURSE PRACTITIONER

## 2023-11-03 PROCEDURE — 99214 OFFICE O/P EST MOD 30 MIN: CPT | Performed by: NURSE PRACTITIONER

## 2023-11-03 PROCEDURE — 90471 IMMUNIZATION ADMIN: CPT | Performed by: NURSE PRACTITIONER

## 2023-11-03 PROCEDURE — 3074F SYST BP LT 130 MM HG: CPT | Performed by: NURSE PRACTITIONER

## 2023-11-03 PROCEDURE — 90674 CCIIV4 VAC NO PRSV 0.5 ML IM: CPT | Performed by: NURSE PRACTITIONER

## 2023-11-03 PROCEDURE — 3078F DIAST BP <80 MM HG: CPT | Performed by: NURSE PRACTITIONER

## 2023-11-03 RX ORDER — DICLOFENAC SODIUM 75 MG/1
75 TABLET, DELAYED RELEASE ORAL 2 TIMES DAILY
COMMUNITY
Start: 2023-10-19

## 2023-11-03 RX ORDER — SEMAGLUTIDE 0.25 MG/.5ML
0.25 INJECTION, SOLUTION SUBCUTANEOUS
Qty: 2 ML | Refills: 0 | Status: SHIPPED | OUTPATIENT
Start: 2023-11-03

## 2023-11-03 RX ORDER — MELOXICAM 7.5 MG/1
7.5 TABLET ORAL DAILY
COMMUNITY
Start: 2023-08-16

## 2023-11-03 RX ORDER — VALSARTAN AND HYDROCHLOROTHIAZIDE 320; 12.5 MG/1; MG/1
1 TABLET, FILM COATED ORAL DAILY
Qty: 90 TABLET | Refills: 1 | Status: SHIPPED | OUTPATIENT
Start: 2023-11-03

## 2023-11-03 SDOH — ECONOMIC STABILITY: FOOD INSECURITY: WITHIN THE PAST 12 MONTHS, YOU WORRIED THAT YOUR FOOD WOULD RUN OUT BEFORE YOU GOT MONEY TO BUY MORE.: NEVER TRUE

## 2023-11-03 SDOH — ECONOMIC STABILITY: INCOME INSECURITY: HOW HARD IS IT FOR YOU TO PAY FOR THE VERY BASICS LIKE FOOD, HOUSING, MEDICAL CARE, AND HEATING?: NOT HARD AT ALL

## 2023-11-03 SDOH — ECONOMIC STABILITY: TRANSPORTATION INSECURITY
IN THE PAST 12 MONTHS, HAS LACK OF TRANSPORTATION KEPT YOU FROM MEETINGS, WORK, OR FROM GETTING THINGS NEEDED FOR DAILY LIVING?: NO

## 2023-11-03 SDOH — ECONOMIC STABILITY: HOUSING INSECURITY
IN THE LAST 12 MONTHS, WAS THERE A TIME WHEN YOU DID NOT HAVE A STEADY PLACE TO SLEEP OR SLEPT IN A SHELTER (INCLUDING NOW)?: NO

## 2023-11-03 SDOH — ECONOMIC STABILITY: FOOD INSECURITY: WITHIN THE PAST 12 MONTHS, THE FOOD YOU BOUGHT JUST DIDN'T LAST AND YOU DIDN'T HAVE MONEY TO GET MORE.: NEVER TRUE

## 2023-11-03 ASSESSMENT — PATIENT HEALTH QUESTIONNAIRE - PHQ9
SUM OF ALL RESPONSES TO PHQ9 QUESTIONS 1 & 2: 0
2. FEELING DOWN, DEPRESSED OR HOPELESS: 0
SUM OF ALL RESPONSES TO PHQ QUESTIONS 1-9: 0
1. LITTLE INTEREST OR PLEASURE IN DOING THINGS: 0
SUM OF ALL RESPONSES TO PHQ QUESTIONS 1-9: 0

## 2023-11-03 NOTE — PROGRESS NOTES
[facility] Note    Well Adult Note  Name: Pradeep Chung Date: 11/3/2023   MRN: [id number] Sex: Male   Age: 37 y.o. Ethnicity: Non- / Non    : 1980 Race: Other       [place] is here for well adult exam. Working with , nutritionist and physical therapy         Review of Systems   Constitutional:  Positive for fatigue. Negative for chills, diaphoresis and fever. HENT: Negative. Respiratory: Negative. Cardiovascular: Negative. Gastrointestinal: Negative. Genitourinary: Negative. Musculoskeletal: Negative. Skin: Negative. Neurological: Negative. Allergies   Allergen Reactions    [place] Grass Extract Itching and Rash    Pollen Extract Itching, Palpitations and Rash         Prior to Visit Medications    Medication Sig Taking?  Authorizing Provider   diclofenac (VOLTAREN) 75 MG EC tablet Take 1 tablet by mouth 2 times daily Yes Provider, MD Corey   meloxicam (MOBIC) 7.5 MG tablet Take 1 tablet by mouth daily Yes Provider, MD Corey   valsartan-hydroCHLOROthiazide (DIOVAN-HCT) 320-12.5 MG per tablet Take 1 tablet by mouth daily Yes Gilma Au APRN - NP   Semaglutide-Weight Management (WEGOVY) 0.25 MG/0.5ML SOAJ SC injection Inject 0.25 mg into the skin every 7 days Yes Gilma Au APRN - NP   albuterol sulfate HFA (PROVENTIL;VENTOLIN;PROAIR) 108 (90 Base) MCG/ACT inhaler Inhale 1-2 puffs into the lungs every 4 hours as needed Yes Automatic Reconciliation, Ar   benralizumab (FASENRA) 30 MG/ML SOSY injection Inject 1 mL into the skin every 30 days Yes Automatic Reconciliation, Ar   budesonide-formoterol (SYMBICORT) 80-4.5 MCG/ACT AERO Inhale 2 puffs into the lungs 2 times daily Yes Automatic Reconciliation, Ar   fluticasone (FLONASE) 50 MCG/ACT nasal spray 2 sprays by Nasal route daily Yes Automatic Reconciliation, Ar   montelukast (SINGULAIR) 10 MG tablet ceived the following from [facility]

## 2023-11-04 LAB
ALBUMIN SERPL-MCNC: 4.2 G/DL (ref 3.5–5)
ALBUMIN/GLOB SERPL: 1.3 (ref 1.1–2.2)
ALP SERPL-CCNC: 52 U/L (ref 45–117)
ALT SERPL-CCNC: 45 U/L (ref 12–78)
ANION GAP SERPL CALC-SCNC: 6 MMOL/L (ref 5–15)
AST SERPL-CCNC: 19 U/L (ref 15–37)
BASOPHILS # BLD: 0 K/UL (ref 0–0.1)
BASOPHILS NFR BLD: 1 % (ref 0–1)
BILIRUB SERPL-MCNC: 0.6 MG/DL (ref 0.2–1)
BUN SERPL-MCNC: 10 MG/DL (ref 6–20)
BUN/CREAT SERPL: 12 (ref 12–20)
CALCIUM SERPL-MCNC: 9.1 MG/DL (ref 8.5–10.1)
CHLORIDE SERPL-SCNC: 106 MMOL/L (ref 97–108)
CHOLEST SERPL-MCNC: 182 MG/DL
CO2 SERPL-SCNC: 26 MMOL/L (ref 21–32)
CREAT SERPL-MCNC: 0.82 MG/DL (ref 0.7–1.3)
DIFFERENTIAL METHOD BLD: NORMAL
EOSINOPHIL # BLD: 0 K/UL (ref 0–0.4)
EOSINOPHIL NFR BLD: 0 % (ref 0–7)
ERYTHROCYTE [DISTWIDTH] IN BLOOD BY AUTOMATED COUNT: 12.4 % (ref 11.5–14.5)
EST. AVERAGE GLUCOSE BLD GHB EST-MCNC: 111 MG/DL
GLOBULIN SER CALC-MCNC: 3.3 G/DL (ref 2–4)
GLUCOSE SERPL-MCNC: 94 MG/DL (ref 65–100)
HBA1C MFR BLD: 5.5 % (ref 4–5.6)
HCT VFR BLD AUTO: 44.8 % (ref 36.6–50.3)
HDLC SERPL-MCNC: 43 MG/DL
HDLC SERPL: 4.2 (ref 0–5)
HGB BLD-MCNC: 14.3 G/DL (ref 12.1–17)
IMM GRANULOCYTES # BLD AUTO: 0 K/UL (ref 0–0.04)
IMM GRANULOCYTES NFR BLD AUTO: 0 % (ref 0–0.5)
LDLC SERPL CALC-MCNC: 116.4 MG/DL (ref 0–100)
LYMPHOCYTES # BLD: 2 K/UL (ref 0.8–3.5)
LYMPHOCYTES NFR BLD: 32 % (ref 12–49)
MCH RBC QN AUTO: 28.7 PG (ref 26–34)
MCHC RBC AUTO-ENTMCNC: 31.9 G/DL (ref 30–36.5)
MCV RBC AUTO: 89.8 FL (ref 80–99)
MONOCYTES # BLD: 0.4 K/UL (ref 0–1)
MONOCYTES NFR BLD: 7 % (ref 5–13)
NEUTS SEG # BLD: 3.9 K/UL (ref 1.8–8)
NEUTS SEG NFR BLD: 60 % (ref 32–75)
NRBC # BLD: 0 K/UL (ref 0–0.01)
NRBC BLD-RTO: 0 PER 100 WBC
PLATELET # BLD AUTO: 327 K/UL (ref 150–400)
PMV BLD AUTO: 10.6 FL (ref 8.9–12.9)
POTASSIUM SERPL-SCNC: 4.3 MMOL/L (ref 3.5–5.1)
PROT SERPL-MCNC: 7.5 G/DL (ref 6.4–8.2)
PSA SERPL-MCNC: 0.2 NG/ML (ref 0.01–4)
RBC # BLD AUTO: 4.99 M/UL (ref 4.1–5.7)
SODIUM SERPL-SCNC: 138 MMOL/L (ref 136–145)
T4 FREE SERPL-MCNC: 1.1 NG/DL (ref 0.8–1.5)
TRIGL SERPL-MCNC: 113 MG/DL
TSH SERPL DL<=0.05 MIU/L-ACNC: 2.32 UIU/ML (ref 0.36–3.74)
VLDLC SERPL CALC-MCNC: 22.6 MG/DL
WBC # BLD AUTO: 6.4 K/UL (ref 4.1–11.1)

## 2023-11-06 ASSESSMENT — ENCOUNTER SYMPTOMS
RESPIRATORY NEGATIVE: 1
GASTROINTESTINAL NEGATIVE: 1

## 2023-11-08 LAB
TESTOST FREE SERPL-MCNC: 8.6 PG/ML (ref 6.8–21.5)
TESTOST SERPL-MCNC: 209 NG/DL (ref 264–916)

## 2023-11-20 ENCOUNTER — PATIENT MESSAGE (OUTPATIENT)
Age: 43
End: 2023-11-20

## 2023-11-20 RX ORDER — SILDENAFIL 100 MG/1
100 TABLET, FILM COATED ORAL PRN
Qty: 30 TABLET | Refills: 1 | Status: SHIPPED | OUTPATIENT
Start: 2023-11-20

## 2023-11-20 NOTE — TELEPHONE ENCOUNTER
PCP: MENDOZA Raya - NP    Last appt: 11/3/2023   No future appointments.     Requested Prescriptions     Pending Prescriptions Disp Refills    sildenafil (VIAGRA) 100 MG tablet 30 tablet      Sig: Take 1 tablet by mouth as needed         Prior labs and Blood pressures:  BP Readings from Last 3 Encounters:   11/03/23 (!) 150/84   04/27/22 123/86     Lab Results   Component Value Date/Time     11/03/2023 12:01 PM    K 4.3 11/03/2023 12:01 PM     11/03/2023 12:01 PM    CO2 26 11/03/2023 12:01 PM    BUN 10 11/03/2023 12:01 PM    GFRAA >60 02/11/2021 09:54 AM     No results found for: \"HBA1C\", \"[id number]\"  Lab Results   Component Value Date/Time    CHOL 182 11/03/2023 12:01 PM    HDL 43 11/03/2023 12:01 PM     No results found for: \"VITD3\", \"VD3RIA\"    Lab Results   Component Value Date/Time    TSH 2.250 11/15/2019 09:31 AM

## 2023-11-21 DIAGNOSIS — E29.1 HYPOGONADISM IN MALE: Primary | ICD-10-CM

## 2024-05-25 DIAGNOSIS — I10 UNCONTROLLED HYPERTENSION: ICD-10-CM

## 2024-05-28 RX ORDER — VALSARTAN AND HYDROCHLOROTHIAZIDE 320; 12.5 MG/1; MG/1
1 TABLET, FILM COATED ORAL DAILY
Qty: 30 TABLET | Refills: 0 | Status: SHIPPED | OUTPATIENT
Start: 2024-05-28

## 2024-05-28 NOTE — TELEPHONE ENCOUNTER
Left voicemail to callback SHAINA Au gave the pt a temporary supply of his Diovan-HCT.However pt need's to make an In Office Appt.

## 2024-05-28 NOTE — TELEPHONE ENCOUNTER
PCP: Tamika Au, APRN - NP    Last appt: 11/3/2023   No future appointments.    Requested Prescriptions     Pending Prescriptions Disp Refills    valsartan-hydroCHLOROthiazide (DIOVAN-HCT) 320-12.5 MG per tablet [Pharmacy Med Name: VALSARTAN-HCTZ 320-12.5 MG TAB] 90 tablet 1     Sig: TAKE 1 TABLET BY MOUTH EVERY DAY         Prior labs and Blood pressures:  BP Readings from Last 3 Encounters:   11/03/23 (!) 150/84   04/27/22 123/86     Lab Results   Component Value Date/Time     11/03/2023 12:01 PM    K 4.3 11/03/2023 12:01 PM     11/03/2023 12:01 PM    CO2 26 11/03/2023 12:01 PM    BUN 10 11/03/2023 12:01 PM    GFRAA >60 02/11/2021 09:54 AM     No results found for: \"HBA1C\", \"[id number]\"  Lab Results   Component Value Date/Time    CHOL 182 11/03/2023 12:01 PM    HDL 43 11/03/2023 12:01 PM    .4 11/03/2023 12:01 PM    VLDL 22.6 11/03/2023 12:01 PM     No results found for: \"VITD3\"    Lab Results   Component Value Date/Time    TSH 2.250 11/15/2019 09:31 AM

## 2024-06-21 DIAGNOSIS — I10 UNCONTROLLED HYPERTENSION: ICD-10-CM

## 2024-06-21 NOTE — TELEPHONE ENCOUNTER
Sent pt a My Chart message informing him  that we had received a request for his Valsartan-Hydrochlorothiazide,and that it has yet to be approved until he make's an appt.

## 2024-06-21 NOTE — TELEPHONE ENCOUNTER
PCP: Tamika Au, APRN - NP    Last appt: 11/3/2023   No future appointments.    Requested Prescriptions     Pending Prescriptions Disp Refills    valsartan-hydroCHLOROthiazide (DIOVAN-HCT) 320-12.5 MG per tablet [Pharmacy Med Name: VALSARTAN-HCTZ 320-12.5 MG TAB] 30 tablet 0     Sig: TAKE 1 TABLET BY MOUTH EVERY DAY         Prior labs and Blood pressures:  BP Readings from Last 3 Encounters:   11/03/23 (!) 150/84   04/27/22 123/86     Lab Results   Component Value Date/Time     11/03/2023 12:01 PM    K 4.3 11/03/2023 12:01 PM     11/03/2023 12:01 PM    CO2 26 11/03/2023 12:01 PM    BUN 10 11/03/2023 12:01 PM    GFRAA >60 02/11/2021 09:54 AM     No results found for: \"HBA1C\", \"[id number]\"  Lab Results   Component Value Date/Time    CHOL 182 11/03/2023 12:01 PM    HDL 43 11/03/2023 12:01 PM    .4 11/03/2023 12:01 PM    VLDL 22.6 11/03/2023 12:01 PM     No results found for: \"VITD3\"    Lab Results   Component Value Date/Time    TSH 2.250 11/15/2019 09:31 AM

## 2024-07-31 RX ORDER — VALSARTAN AND HYDROCHLOROTHIAZIDE 320; 12.5 MG/1; MG/1
1 TABLET, FILM COATED ORAL DAILY
Qty: 30 TABLET | Refills: 0 | OUTPATIENT
Start: 2024-07-31